# Patient Record
Sex: FEMALE | Race: WHITE | NOT HISPANIC OR LATINO | Employment: OTHER | URBAN - METROPOLITAN AREA
[De-identification: names, ages, dates, MRNs, and addresses within clinical notes are randomized per-mention and may not be internally consistent; named-entity substitution may affect disease eponyms.]

---

## 2017-03-29 PROBLEM — R10.31 RIGHT LOWER QUADRANT ABDOMINAL PAIN: Status: ACTIVE | Noted: 2017-03-29

## 2023-03-24 PROBLEM — N39.46 MIXED STRESS AND URGE URINARY INCONTINENCE: Status: ACTIVE | Noted: 2017-04-07

## 2023-03-24 PROBLEM — E78.5 HYPERLIPIDEMIA: Status: ACTIVE | Noted: 2018-04-27

## 2023-03-24 PROBLEM — Z00.00 MEDICARE ANNUAL WELLNESS VISIT, SUBSEQUENT: Status: ACTIVE | Noted: 2023-03-24

## 2023-03-24 PROBLEM — N36.1 URETHRAL DIVERTICULUM: Status: ACTIVE | Noted: 2017-04-07

## 2023-03-24 PROBLEM — S46.811A STRAIN OF RIGHT TRAPEZIUS MUSCLE: Status: ACTIVE | Noted: 2023-03-24

## 2023-03-24 PROBLEM — N83.209 OVARIAN CYST: Status: ACTIVE | Noted: 2020-01-13

## 2023-03-28 ENCOUNTER — TELEPHONE (OUTPATIENT)
Dept: INTERNAL MEDICINE | Facility: CLINIC | Age: 48
End: 2023-03-28

## 2023-03-28 NOTE — TELEPHONE ENCOUNTER
Caller: Bettye Chanel    Relationship: Self    Best call back number: [phone]    Caller requesting test results: YES    What test was performed: LABS    When was the test performed: 3/24/23    Where was the test performed: IN OFFICE    Additional notes: PLEASE CONTACT PATIENT WITH HER LAB RESULTS ONCE THEY HAVE BEEN REVIEWED

## 2023-03-28 NOTE — TELEPHONE ENCOUNTER
Looks like they haven't resulted, but we will let her know.  Thanks.   This document is complete and the patient is ready for discharge.

## 2023-04-04 ENCOUNTER — TELEPHONE (OUTPATIENT)
Dept: INTERNAL MEDICINE | Facility: CLINIC | Age: 48
End: 2023-04-04

## 2023-04-04 RX ORDER — ERGOCALCIFEROL 1.25 MG/1
50000 CAPSULE ORAL WEEKLY
Qty: 5 CAPSULE | Refills: 1 | Status: SHIPPED | OUTPATIENT
Start: 2023-04-04 | End: 2023-07-19 | Stop reason: SDUPTHER

## 2023-04-04 NOTE — TELEPHONE ENCOUNTER
Caller: Bettye Chanel    Relationship: Self    Best call back number: [phone]    What was the call regarding: PATIENT IS WANTING TO GO OVER HER TEST RESULTS. PATIENT IS ALSO WANTING SCHEDULED FOR HER MAMMOGRAM. PATIENT IS ASKING FOR A CALLBACK.     Do you require a callback: YES

## 2023-04-05 NOTE — TELEPHONE ENCOUNTER
I responded to a MyChart message from patient.  You do not need to call patient.      Dr. Gonzales is out; I am covering for him. Labs from 3/24/23 show normal blood counts. HIV and hepatitis c were negative. One of the tests was not completed due to specimen contamination; however the test was performed in the ER on 3/29/23 and showed a slightly elevated glucose, normal kidney and liver function. Potassium level was normal. Cholesterol and triglycerides were a little elevated. Lipase was normal which decreases risk for pancreatitis. Your Vitamin D was a little low; I will send vitamin d to your pharmacy to take 50,000 units (1 tablet) weekly. Please follow-up with PCP as instructed at your last appointment or sooner if worsening.

## 2023-04-11 PROBLEM — R01.1 HEART MURMUR: Status: ACTIVE | Noted: 2023-04-11

## 2023-04-11 PROBLEM — R16.0 HEPATOMEGALY: Status: ACTIVE | Noted: 2023-04-11

## 2023-04-11 PROBLEM — N81.10 FEMALE CYSTOCELE: Status: RESOLVED | Noted: 2017-03-27 | Resolved: 2023-04-11

## 2023-04-25 PROBLEM — I51.89 DIASTOLIC DYSFUNCTION: Status: ACTIVE | Noted: 2023-04-25

## 2023-04-25 PROBLEM — I35.0 NONRHEUMATIC AORTIC VALVE STENOSIS: Status: ACTIVE | Noted: 2023-03-24

## 2023-04-26 PROBLEM — K76.0 HEPATIC STEATOSIS: Status: ACTIVE | Noted: 2023-04-26

## 2023-05-12 PROBLEM — N60.19 FIBROCYSTIC BREAST: Status: ACTIVE | Noted: 2023-05-12

## 2023-07-25 ENCOUNTER — TELEPHONE (OUTPATIENT)
Dept: INTERNAL MEDICINE | Facility: CLINIC | Age: 48
End: 2023-07-25
Payer: MEDICARE

## 2023-07-25 DIAGNOSIS — K76.0 HEPATIC STEATOSIS: Primary | ICD-10-CM

## 2023-07-25 DIAGNOSIS — E88.89 STEATOSIS: ICD-10-CM

## 2023-07-26 ENCOUNTER — HOSPITAL ENCOUNTER (OUTPATIENT)
Dept: ULTRASOUND IMAGING | Facility: HOSPITAL | Age: 48
Discharge: HOME | End: 2023-07-26
Admitting: STUDENT IN AN ORGANIZED HEALTH CARE EDUCATION/TRAINING PROGRAM
Payer: MEDICARE

## 2023-07-26 ENCOUNTER — LAB (OUTPATIENT)
Dept: INTERNAL MEDICINE | Facility: CLINIC | Age: 48
End: 2023-07-26
Payer: MEDICARE

## 2023-07-26 DIAGNOSIS — R16.0 HEPATOMEGALY: ICD-10-CM

## 2023-07-26 DIAGNOSIS — K76.0 HEPATIC STEATOSIS: ICD-10-CM

## 2023-07-26 DIAGNOSIS — E88.89 STEATOSIS: ICD-10-CM

## 2023-07-26 PROCEDURE — 76705 ECHO EXAM OF ABDOMEN: CPT

## 2023-07-26 PROCEDURE — 36415 COLL VENOUS BLD VENIPUNCTURE: CPT | Performed by: STUDENT IN AN ORGANIZED HEALTH CARE EDUCATION/TRAINING PROGRAM

## 2023-07-28 LAB
A2 MACROGLOB SERPL-MCNC: 206 MG/DL (ref 110–276)
ALT SERPL W P-5'-P-CCNC: 31 IU/L (ref 0–40)
APO A-I SERPL-MCNC: 124 MG/DL (ref 116–209)
AST SERPL W P-5'-P-CCNC: 19 IU/L (ref 0–40)
BILIRUB SERPL-MCNC: 0.4 MG/DL (ref 0–1.2)
CHOLEST SERPL-MCNC: 237 MG/DL (ref 100–199)
FIBROSIS SCORING:: ABNORMAL
FIBROSIS STAGE SERPL QL: ABNORMAL
GGT SERPL-CCNC: 21 IU/L (ref 0–60)
GLUCOSE SERPL-MCNC: 185 MG/DL (ref 70–99)
HAPTOGLOB SERPL-MCNC: 153 MG/DL (ref 42–296)
LABORATORY COMMENT REPORT: ABNORMAL
LIVER FIBR SCORE SERPL CALC.FIBROSURE: 0.15 (ref 0–0.21)
LIVER STEATOSIS GRADE SERPL QL: ABNORMAL
LIVER STEATOSIS SCORE SERPL: 0.82 (ref 0–0.4)
NASH GRADE SERPL QL: ABNORMAL
NASH INTERPRETATION SERPL-IMP: ABNORMAL
NASH SCORE SERPL: 0.32 (ref 0–0.25)
NASH SCORING: ABNORMAL
STEATOSIS SCORING: ABNORMAL
TEST PERFORMANCE INFO SPEC: ABNORMAL
TEST PERFORMANCE INFO SPEC: ABNORMAL
TRIGL SERPL-MCNC: 292 MG/DL (ref 0–149)

## 2023-08-10 DIAGNOSIS — N39.0 URINARY TRACT INFECTION WITHOUT HEMATURIA, SITE UNSPECIFIED: Primary | ICD-10-CM

## 2023-08-10 DIAGNOSIS — N39.0 URINARY TRACT INFECTION WITHOUT HEMATURIA, SITE UNSPECIFIED: ICD-10-CM

## 2023-08-11 ENCOUNTER — TELEPHONE (OUTPATIENT)
Dept: OBSTETRICS AND GYNECOLOGY | Facility: CLINIC | Age: 48
End: 2023-08-11
Payer: MEDICARE

## 2023-08-11 NOTE — TELEPHONE ENCOUNTER
Pt is calling and asking if we had the results of her UTI and US. Wanting to know by end of day if we needed to treat her for the UTI.    Please advise    Thank you!

## 2023-08-12 LAB
APPEARANCE UR: CLEAR
BACTERIA #/AREA URNS HPF: NORMAL /[HPF]
BILIRUB UR QL STRIP: NEGATIVE
CASTS URNS QL MICRO: NORMAL /LPF
COLOR UR: YELLOW
EPI CELLS #/AREA URNS HPF: NORMAL /HPF (ref 0–10)
GLUCOSE UR QL STRIP: ABNORMAL
HGB UR QL STRIP: NEGATIVE
KETONES UR QL STRIP: NEGATIVE
LEUKOCYTE ESTERASE UR QL STRIP: NEGATIVE
MICRO URNS: ABNORMAL
MICRO URNS: ABNORMAL
NITRITE UR QL STRIP: NEGATIVE
PH UR STRIP: 5.5 [PH] (ref 5–7.5)
PROT UR QL STRIP: NEGATIVE
RBC #/AREA URNS HPF: NORMAL /HPF (ref 0–2)
SP GR UR STRIP: >=1.03 (ref 1–1.03)
URINALYSIS REFLEX: ABNORMAL
UROBILINOGEN UR STRIP-MCNC: 0.2 MG/DL (ref 0.2–1)
WBC #/AREA URNS HPF: NORMAL /HPF (ref 0–5)

## 2023-09-07 ENCOUNTER — OFFICE VISIT (OUTPATIENT)
Dept: GASTROENTEROLOGY | Facility: CLINIC | Age: 48
End: 2023-09-07
Payer: MEDICARE

## 2023-09-07 VITALS
HEART RATE: 84 BPM | WEIGHT: 209 LBS | DIASTOLIC BLOOD PRESSURE: 74 MMHG | HEIGHT: 64 IN | SYSTOLIC BLOOD PRESSURE: 133 MMHG | BODY MASS INDEX: 35.68 KG/M2

## 2023-09-07 DIAGNOSIS — E11.65 TYPE 2 DIABETES MELLITUS WITH HYPERGLYCEMIA, WITH LONG-TERM CURRENT USE OF INSULIN: ICD-10-CM

## 2023-09-07 DIAGNOSIS — E66.01 CLASS 2 SEVERE OBESITY WITH SERIOUS COMORBIDITY AND BODY MASS INDEX (BMI) OF 35.0 TO 35.9 IN ADULT, UNSPECIFIED OBESITY TYPE: ICD-10-CM

## 2023-09-07 DIAGNOSIS — R16.0 HEPATOMEGALY: ICD-10-CM

## 2023-09-07 DIAGNOSIS — Z79.4 TYPE 2 DIABETES MELLITUS WITH HYPERGLYCEMIA, WITH LONG-TERM CURRENT USE OF INSULIN: ICD-10-CM

## 2023-09-07 DIAGNOSIS — K58.1 IRRITABLE BOWEL SYNDROME WITH CONSTIPATION: Primary | ICD-10-CM

## 2023-09-07 DIAGNOSIS — Z98.890 HISTORY OF COLONOSCOPY: ICD-10-CM

## 2023-09-07 DIAGNOSIS — K76.0 FATTY INFILTRATION OF LIVER: ICD-10-CM

## 2023-09-07 DIAGNOSIS — R10.9 ABDOMINAL PAIN, UNSPECIFIED ABDOMINAL LOCATION: ICD-10-CM

## 2023-09-07 RX ORDER — EMPAGLIFLOZIN 10 MG/1
TABLET, FILM COATED ORAL
Qty: 30 TABLET | Refills: 0 | Status: SHIPPED | OUTPATIENT
Start: 2023-09-07

## 2023-09-07 RX ORDER — TENAPANOR HYDROCHLORIDE 53.2 MG/1
50 TABLET ORAL DAILY
Qty: 90 TABLET | Refills: 3 | Status: SHIPPED | OUTPATIENT
Start: 2023-09-07

## 2023-09-07 RX ORDER — FLUTICASONE PROPIONATE 50 MCG
SPRAY, SUSPENSION (ML) NASAL
COMMUNITY

## 2023-09-07 RX ORDER — TENAPANOR HYDROCHLORIDE 53.2 MG/1
50 TABLET ORAL DAILY
Qty: 18 TABLET | Refills: 0 | COMMUNITY
Start: 2023-09-07

## 2023-09-07 NOTE — PROGRESS NOTES
GASTROENTEROLOGY OFFICE NOTE    Bettye Diaz  [id number]  1975    CARE TEAM  Patient Care Team:  Daryn Gonzales MD as PCP - General (Family Medicine)    Referring Provider: Daryn Gonzales MD    Chief Complaint   Patient presents with    CAMPBELL 1, Right Side Swelling, Abdominal pain, nausea, consti        HISTORY OF PRESENT ILLNESS:   Bettye Diaz is a 47 y.o. female who presents to the clinic today as a referral from Dr. Daryn Gonzales for evaluation regarding hepatomegaly, hepatic steatosis (review of PMH as below reveals history of cirrhosis), prior referral for gastroparesis.  Gastric emptying study previously ordered but I do not believe it was completed.    3/29/2023 CT abdomen and pelvis without contrast revealed nonspecific hepatomegaly with liver measuring 22 cm and craniocaudal dimension.    4/25/2023 ultrasound of the abdomen due to right upper quadrant abdominal pain, epigastric pain revealed moderate diffuse hepatic steatosis, prior cholecystectomy with unremarkable 8 mm common bile duct likely prominent secondary to postcholecystectomy state.    7/26/2023 ultrasound of the liver due to hepatomegaly revealed hepatic steatosis, liver measures 20 cm in craniocaudal length.      7/26/2023 [facility] revealed F0, S2-S3, and 1 mild CAMPBELL.  Bilirubin normal 0.4.  GGT normal 21, ALT normal 31, AST normal at 19.   Prior CBC with normal platelet count, INR normal, CMP with normal albumin.     She reports right side abdominal pain, intermittent swelling of the right side as well as intermittent bulge in the middle of the abdomen.  She has a bowel movement every other day.  It does not seem as though on days she has a bowel movement swelling is improved.  She has history of  treatment for constipation. MiraLAX previously helpful but not covered by insurance.  Prior use of Linzess caused diarrhea.  Prior use of Trulance was not helpful    She had prior colonoscopy at [facility] or [facility]  [facility] possibly approximately 5 years ago with possible finding of colitis.  It seems as though it was recommended she repeat colonoscopy in 5 years.    Hydrocodone on medication list but she reports she does not take hydrocodone very often.  She occasionally takes naproxen.  She has been taking omeprazole for approximately 18 years due to history of heartburn and reflux.  If she takes omeprazole daily she does not experience heartburn or reflux.  If she does not take omeprazole she has heartburn or reflux.    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    Urinary leakage         Past Surgical History:   Procedure Laterality Date    ABDOMINAL SURGERY      ANKLE SURGERY      BACK SURGERY  1989    BACK SURGERY  03/2019    BONE SPUR LEG      CERVICAL DISCECTOMY ANTERIOR      CERVICAL FUSION      CHOLECYSTECTOMY  1998    COLONOSCOPY      HYSTERECTOMY  2016    LUMBAR DISCECTOMY      NECK SURGERY  2015    OOPHORECTOMY Right     OVARY SURGERY Bilateral     ovarian drilling    TONSILLECTOMY          Current Outpatient Medications on File Prior to Visit   Medication Sig    BD Insulin Syringe U/F 31G X 5/16\" 1 ML misc USE 1 SYRINGE 4 TIMES DAILY AS DIRECTED    Blood Glucose Monitoring Suppl (Accu-Chek Guide Me) w/Device kit USE TO CHECK GLUCOSE AS DIRECTED THREE TIMES DAILY    cetirizine (ZyrTEC) 10 MG chewable tablet Zyrtec    cyclobenzaprine (FLEXERIL) 10 MG tablet cyclobenzaprine 10 mg tablet    fluconazole (DIFLUCAN) 150 MG tablet Take 1 tab PO daily at beginning of abx, then 1 tab PO daily at end of abx    fluticasone (FLONASE) 50 MCG/ACT nasal spray Spray 2 " sprays every day by intranasal route for 30 days.    glucose blood test strip Use 3/day  DX CODE E 11.9    HYDROcodone-acetaminophen (NORCO) 5-325 MG per tablet Take 1 tablet by mouth Every 6 (Six) Hours As Needed for Severe Pain for up to 12 doses.    Insulin Aspart (novoLOG) 100 UNIT/ML injection Inject 90 Units under the skin into the appropriate area as directed 3 (Three) Times a Day Before Meals.    insulin glargine (LANTUS, SEMGLEE) 100 UNIT/ML injection Inject 90 Units under the skin into the appropriate area as directed Every Night.    Lancets 28G misc lancets   Two times a day    naproxen (NAPROSYN) 500 MG tablet Take 1 tablet by mouth 2 (Two) Times a Day With Meals.    omeprazole (priLOSEC) 40 MG capsule omeprazole 40 mg capsule,delayed release   Take 1 capsule by mouth twice daily    pravastatin (PRAVACHOL) 10 MG tablet     telmisartan (MICARDIS) 80 MG tablet Take 1 tablet by mouth Daily.    vitamin D (ERGOCALCIFEROL) 1.25 MG (43424 UT) capsule capsule Take 1 capsule by mouth 1 (One) Time Per Week.    DULoxetine (CYMBALTA) 60 MG capsule TAKE 1 CAPSULE BY MOUTH ONCE DAILY FOR ANXIETY AND FIBROMYALGIA    [DISCONTINUED] benzonatate (TESSALON) 100 MG capsule Take 1 capsule by mouth 3 (Three) Times a Day As Needed for Cough.    [DISCONTINUED] doxycycline (MONODOX) 100 MG capsule Take 1 capsule by mouth 2 (Two) Times a Day.    [DISCONTINUED] empagliflozin (Jardiance) 10 MG tablet tablet Take 1 tablet by mouth Daily.    [DISCONTINUED] metoclopramide (REGLAN) 10 MG tablet Take 1 tablet by mouth 3 (Three) Times a Day With Meals.    [DISCONTINUED] metoprolol succinate XL (TOPROL-XL) 50 MG 24 hr tablet Take 1 tablet by mouth every night at bedtime.    [DISCONTINUED] ondansetron ODT (ZOFRAN-ODT) 4 MG disintegrating tablet ondansetron 4 mg disintegrating tablet   DISSOLVE 1 TABLET IN MOUTH EVERY 6 HOURS AS NEEDED FOR NAUSEA FOR UP TO 5 DAYS     No current facility-administered medications on file prior to visit.  "      Allergies   Allergen Reactions    Contrast Dye (Echo Or Unknown Ct/Mr) Anaphylaxis    Iodine Swelling     Ct dye/DROPS BLOOD PRESSURE    Shellfish-Derived Products Swelling     SWELLING/NAUSEA & VOMITING    Meloxicam Rash     Other reaction(s): My doctor told me to stop taking aspirin because of GI upset    Metronidazole GI Intolerance, Diarrhea, Nausea And Vomiting, Other (See Comments) and Rash     2when taken orally and topically localized rash and blisters  BURNING  BURNING  BURNING      Miconazole Nitrate Rash    Oxycodone Rash    Sulfamethoxazole-Trimethoprim Rash       Family History   Problem Relation Age of Onset    Hypertension Mother     Diabetes Mother     Pancreatitis Father     Prostate cancer Father     Heart disease Father     Breast cancer Paternal Aunt         Late 40's    Breast cancer Paternal Aunt         Early 50's    Breast cancer Paternal Aunt         Late 40's    Ovarian cancer Maternal Grandmother 50    Osteoporosis Maternal Grandmother     Diabetes Maternal Grandmother     Heart disease Maternal Grandmother     Ovarian cancer Maternal Grandfather 53    Diabetes Maternal Grandfather     Ovarian cancer Other 45        cousins    Endometrial cancer Neg Hx        Social History     Socioeconomic History    Marital status:    Tobacco Use    Smoking status: Never    Smokeless tobacco: Never   Vaping Use    Vaping Use: Never used   Substance and Sexual Activity    Alcohol use: No    Drug use: No    Sexual activity: Yes     Partners: Male     Birth control/protection: Surgical, Hysterectomy       PHYSICAL EXAM   /74 (BP Location: Left arm, Patient Position: Sitting, Cuff Size: Adult)   Pulse 84   Ht 162.6 cm (64\")   Wt 94.8 kg (209 lb)   LMP  (LMP Unknown)   BMI 35.87 kg/m²   Physical Exam  Constitutional:       General: She is not in acute distress.     Appearance: She is not toxic-appearing.   HENT:      Head: Normocephalic and atraumatic. No contusion.      Right Ear: " External ear normal.      Left Ear: External ear normal.   Eyes:      General: Lids are normal. No scleral icterus.        Right eye: No discharge.         Left eye: No discharge.      Extraocular Movements: Extraocular movements intact.   Neck:      Trachea: Trachea normal.      Comments: No visible mass  No visible adenopathy  Cardiovascular:      Rate and Rhythm: Normal rate.   Pulmonary:      Effort: No respiratory distress.      Comments: Symmetrical expansion    Abdominal:      Palpations: Abdomen is soft. There is no mass.      Tenderness: There is abdominal tenderness in the epigastric area.      Comments: Suspect diastasis recti   Musculoskeletal:      Right lower leg: No edema.      Left lower leg: No edema.      Comments: Symmetrical movement of upper extremities  Symmetrical movement of lower extremities  No visible deformities   Skin:     General: Skin is warm and dry.      Coloration: Skin is not jaundiced.   Neurological:      General: No focal deficit present.      Mental Status: She is alert and oriented to person, place, and time.   Psychiatric:         Mood and Affect: Mood normal.         Behavior: Behavior normal.         Thought Content: Thought content normal.       Results Review:  3/29/2023 CT abdomen and pelvis without contrast revealed nonspecific hepatomegaly with liver measuring 22 cm and craniocaudal dimension.    4/25/2023 ultrasound of the abdomen due to right upper quadrant abdominal pain, epigastric pain revealed moderate diffuse hepatic steatosis, prior cholecystectomy with unremarkable 8 mm common bile duct likely prominent secondary to postcholecystectomy state.    7/26/2023 ultrasound of the liver due to hepatomegaly revealed hepatic steatosis, liver measures 20 cm in craniocaudal length.      7/26/2023 [facility] revealed F0, S2-S3, and 1 mild CAMPBELL.  Bilirubin normal 0.4.  GGT normal 21, ALT normal 31, AST normal at 19.   CMP          3/24/2023    14:37 3/29/2023    17:38  7/12/2023    15:40   CMP   Glucose CANCELED  149  CANCELED    BUN 12  10  14    Creatinine 0.63  0.76  0.66    EGFR  98.0     Sodium 138  138  138    Potassium CANCELED  3.8  CANCELED    Chloride 104  103  101    Calcium 9.2  8.8  8.9    Total Protein 6.5   6.5    Total Protein  6.7     Albumin 4.1  3.6  4.1    Globulin 2.4   2.4    Globulin  3.1     Total Bilirubin 0.3  0.2  0.5    Alkaline Phosphatase 57  63  54    AST (SGOT) 12  13  21    ALT (SGPT) 14  14  27    Albumin/Globulin Ratio  1.2     BUN/Creatinine Ratio 19  13.2  21    Anion Gap  12.0           ASSESSMENT / PLAN  1. Irritable bowel syndrome with constipation  - MiraLAX previously helpful but not covered by insurance.  Prior use of Linzess caused diarrhea.  Prior use of Trulance was not helpful  - trial of taking Ibsrela daily, [facility] with medication asccess  - suspect IBS contributing to abdominal pain  - Tenapanor HCl (Ibsrela) 50 MG tablet; Take 1 tablet by mouth Daily.  Dispense: 18 tablet; Refill: 0 SAMPLE  - Tenapanor HCl (Ibsrela) 50 MG tablet; Take 1 tablet by mouth Daily. Prior to a meal  Dispense: 90 tablet; Refill: 3    2. Abdominal pain, unspecified abdominal location  - plan for EGD for additional evaluation  -Continue omeprazole 40 mg daily  -Treat suspected irritable bowel syndrome as above as I suspect irritable bowel syndrome contributing to abdominal pain    3. History of colonoscopy  - BALJIT signed by the patient in an attempt to obtain prior colonoscopy report, pathology report if available, follow-up letter to the patient to determine when repeat colonoscopy is due.  If she was told colonoscopy needed to be repeated in 5 years and it was performed in 2018 she is due for repeat colonoscopy this year.  -I was unable to find report In [facility] care link under Bettye Diaz and Bettye Zazueta.   4. Hepatomegaly  5. Hepatic steatosis   6. Obesity  - currently with normal liver enzymes, recommend  checking liver enzymes, albumin to evaluate for hypoalbuminemia, platelet count to evaluate for thrombocytopenia (due to concern for cirrhosis with hypoalbuminemia and thrombocytopenia, INR (if prolonged raises concern for cirrhosis) at our office or with PCP every 6 months to 1 year  - continue to avoid alcohol  - 10% of total body weight loss would be helpful  - consider checking for immunity to hepatitis A and B in the future.   - 7/26/2023 [facility] revealed F0, S2-S3, and 1 mild CAMPBELL.  Bilirubin normal 0.4.  GGT normal 21, ALT normal 31, AST normal at 19.     Suspect she has diastasis recti for which I recommend weight loss and core strengthening exercises.     Return for Follow-up after EGD.    Lupis Echols, KACY  09/07/2023

## 2023-09-13 ENCOUNTER — OFFICE VISIT (OUTPATIENT)
Dept: INTERNAL MEDICINE | Facility: CLINIC | Age: 48
End: 2023-09-13
Payer: MEDICARE

## 2023-09-13 VITALS
RESPIRATION RATE: 18 BRPM | DIASTOLIC BLOOD PRESSURE: 78 MMHG | HEART RATE: 78 BPM | SYSTOLIC BLOOD PRESSURE: 126 MMHG | BODY MASS INDEX: 36.01 KG/M2 | WEIGHT: 209.8 LBS | TEMPERATURE: 97.7 F

## 2023-09-13 DIAGNOSIS — K58.1 IRRITABLE BOWEL SYNDROME WITH CONSTIPATION: ICD-10-CM

## 2023-09-13 DIAGNOSIS — K76.0 HEPATIC STEATOSIS: ICD-10-CM

## 2023-09-13 DIAGNOSIS — B37.31 VULVOVAGINAL CANDIDIASIS: ICD-10-CM

## 2023-09-13 DIAGNOSIS — Z79.4 TYPE 2 DIABETES MELLITUS WITH HYPERGLYCEMIA, WITH LONG-TERM CURRENT USE OF INSULIN: Primary | ICD-10-CM

## 2023-09-13 DIAGNOSIS — Z80.41 FAMILY HISTORY OF OVARIAN CANCER: ICD-10-CM

## 2023-09-13 DIAGNOSIS — E11.65 TYPE 2 DIABETES MELLITUS WITH HYPERGLYCEMIA, WITH LONG-TERM CURRENT USE OF INSULIN: Primary | ICD-10-CM

## 2023-09-13 DIAGNOSIS — E55.9 VITAMIN D DEFICIENCY: ICD-10-CM

## 2023-09-13 PROBLEM — R10.11 RUQ PAIN: Status: RESOLVED | Noted: 2023-03-24 | Resolved: 2023-09-13

## 2023-09-13 PROBLEM — R10.31 RIGHT LOWER QUADRANT ABDOMINAL PAIN: Status: RESOLVED | Noted: 2017-03-29 | Resolved: 2023-09-13

## 2023-09-13 LAB
EXPIRATION DATE: NORMAL
HBA1C MFR BLD: 7.9 %
Lab: NORMAL

## 2023-09-13 RX ORDER — ERGOCALCIFEROL 1.25 MG/1
50000 CAPSULE ORAL WEEKLY
Qty: 6 CAPSULE | Refills: 0 | Status: SHIPPED | OUTPATIENT
Start: 2023-09-13

## 2023-09-13 RX ORDER — FLUCONAZOLE 150 MG/1
150 TABLET ORAL ONCE
Qty: 2 TABLET | Refills: 0 | Status: SHIPPED | OUTPATIENT
Start: 2023-09-13 | End: 2023-09-13

## 2023-09-13 RX ORDER — INSULIN ASPART 100 [IU]/ML
20 INJECTION, SOLUTION INTRAVENOUS; SUBCUTANEOUS
Qty: 100 ML | Refills: 3 | Status: SHIPPED | OUTPATIENT
Start: 2023-09-13

## 2023-09-13 NOTE — ASSESSMENT & PLAN NOTE
- Glucagon 1 mg/0.2mL solution auto-injector, Jardiance 25 mg, and Novolog 100 unit/mL injection have been sent to patient's pharmacy.  - POC hemoglobin A1c has been collected today.

## 2023-09-13 NOTE — PROGRESS NOTES
"    Follow Up Office Visit      Date: 2023   Patient Name: Bettye Diaz  : 1975   MRN: [id number]     Chief Complaint:    Chief Complaint   Patient presents with    Diabetes     fu       History of Present Illness: Bettye Diaz is a 47 y.o. female who is here today to follow up with diabetes mellitus.    Hepatic steatosis; Irritable bowel syndrome with constipation  Ms. Diaz states she is feeling better. She has established care with gastroenterology, but she has not noticed much improvement at this time. She also notes that she has only been taking her medication for a short period of time. Her gastroenterologist suspects that she may have an ulcer, so she is scheduled to have an esophagogastroduodenoscopy performed on 10/27/2023. She was previously set up to have H. pylori testing collected, but she was feeling so sick and unable to stay off of omeprazole. She experiences abdominal pain that primarily occurs during the middle of the night. She describes feeling like something is \"kicking from the inside out.\" This will also occur when she has not eaten in a while.    Type 2 diabetes mellitus with hyperglycemia, with long-term current use of insulin  The patient does feel that Jardiance has been working quite well for her and is requesting a refill of the medication. She reports that her blood glucose levels have improved and have been running between 130 and 150 mg/dL. She states her fasting blood glucose level this morning was 180 mg/dL, but she believes that this is because she had cereal for dinner on 2023. The patient has been having to take less insulin because her glucose levels are trending so well. She has been using a sliding scale for her insulin, and her mealtime doses are typically between 20 and 24 units. She has been monitoring her carbohydrate intake. She believes that she had a hypoglycemic moment on 2023, but she did not check her glucose level because she " "was feeling \"lazy.\" She then got up and began eating a power bar which contains minimal carbohydrates. She has lost approximately 20 pounds. The patient will be scheduling an appointment for her eye examination. She is very excited to have her hemoglobin A1c checked. Each time she checks her blood pressure at home, it appears to be trending quite well. She feels that telmisartan has been working well for her and mentions that her swelling has improved.    Family history of ovarian cancer  The patient wishes to hold off on proceeding with genetic screenings at this time. She has previously undergone a unilateral oophorectomy.    Vitamin D deficiency  The patient has had a history of vitamin D deficiency for a couple of years.    Vulvovaginal candidiasis  Ms. Diaz is experiencing some minor symptoms consistent with a yeast infection and is requesting a refill of Diflucan. She is unsure if this may be caused by Jardiance.      Subjective      Review of Systems:   Review of Systems   Constitutional:  Negative for activity change, appetite change, fatigue and fever.   Eyes:  Negative for blurred vision, photophobia and visual disturbance.   Respiratory:  Negative for cough, chest tightness and shortness of breath.    Cardiovascular:  Negative for chest pain, palpitations and leg swelling.   Gastrointestinal:  Positive for abdominal pain. Negative for abdominal distention, blood in stool, constipation, diarrhea, nausea and vomiting.   Genitourinary:  Negative for dysuria and hematuria.   Musculoskeletal:  Negative for arthralgias, back pain and joint swelling.   Skin:  Negative for rash and wound.   Neurological:  Negative for weakness, headache and confusion.     I have reviewed the patients family history, social history, past medical history, past surgical history and have updated it as appropriate.     Medications:     Current Outpatient Medications:     BD Insulin Syringe U/F 31G X 5/16\" 1 ML misc, USE 1 SYRINGE 4 " TIMES DAILY AS DIRECTED, Disp: , Rfl:     Blood Glucose Monitoring Suppl (Accu-Chek Guide Me) w/Device kit, USE TO CHECK GLUCOSE AS DIRECTED THREE TIMES DAILY, Disp: , Rfl:     cetirizine (ZyrTEC) 10 MG chewable tablet, Zyrtec, Disp: , Rfl:     cyclobenzaprine (FLEXERIL) 10 MG tablet, cyclobenzaprine 10 mg tablet, Disp: , Rfl:     DULoxetine (CYMBALTA) 60 MG capsule, TAKE 1 CAPSULE BY MOUTH ONCE DAILY FOR ANXIETY AND FIBROMYALGIA, Disp: , Rfl:     empagliflozin (Jardiance) 25 MG tablet tablet, Take 1 tablet by mouth Daily., Disp: 60 tablet, Rfl: 1    fluticasone (FLONASE) 50 MCG/ACT nasal spray, Spray 2 sprays every day by intranasal route for 30 days., Disp: , Rfl:     glucose blood test strip, Use 3/day  DX CODE E 11.9, Disp: , Rfl:     HYDROcodone-acetaminophen (NORCO) 5-325 MG per tablet, Take 1 tablet by mouth Every 6 (Six) Hours As Needed for Severe Pain for up to 12 doses., Disp: 12 tablet, Rfl: 0    Insulin Aspart (novoLOG) 100 UNIT/ML injection, Inject 20 Units under the skin into the appropriate area as directed 3 (Three) Times a Day Before Meals., Disp: 100 mL, Rfl: 3    insulin glargine (LANTUS, SEMGLEE) 100 UNIT/ML injection, Inject 90 Units under the skin into the appropriate area as directed Every Night., Disp: , Rfl:     Lancets 28G misc, lancets  Two times a day, Disp: , Rfl:     naproxen (NAPROSYN) 500 MG tablet, Take 1 tablet by mouth 2 (Two) Times a Day With Meals., Disp: , Rfl:     omeprazole (priLOSEC) 40 MG capsule, omeprazole 40 mg capsule,delayed release  Take 1 capsule by mouth twice daily, Disp: , Rfl:     pravastatin (PRAVACHOL) 10 MG tablet, , Disp: , Rfl:     telmisartan (MICARDIS) 80 MG tablet, Take 1 tablet by mouth Daily., Disp: 90 tablet, Rfl: 3    Tenapanor HCl (Ibsrela) 50 MG tablet, Take 1 tablet by mouth Daily., Disp: 18 tablet, Rfl: 0    Tenapanor HCl (Ibsrela) 50 MG tablet, Take 1 tablet by mouth Daily. Prior to a meal, Disp: 90 tablet, Rfl: 3    Glucagon 1 MG/0.2ML solution  auto-injector, Inject 1 mg under the skin into the appropriate area as directed Every 15 (Fifteen) Minutes As Needed (Hypoglycemia)., Disp: 0.4 mL, Rfl: 3    vitamin D (ERGOCALCIFEROL) 1.25 MG (69137 UT) capsule capsule, Take 1 capsule by mouth 1 (One) Time Per Week., Disp: 6 capsule, Rfl: 0    Allergies:   Allergies   Allergen Reactions    Contrast Dye (Echo Or Unknown Ct/Mr) Anaphylaxis    Iodine Swelling     Ct dye/DROPS BLOOD PRESSURE    Shellfish-Derived Products Swelling     SWELLING/NAUSEA & VOMITING    Meloxicam Rash     Other reaction(s): My doctor told me to stop taking aspirin because of GI upset    Metronidazole GI Intolerance, Diarrhea, Nausea And Vomiting, Other (See Comments) and Rash     2when taken orally and topically localized rash and blisters  BURNING  BURNING  BURNING      Miconazole Nitrate Rash    Oxycodone Rash    Sulfamethoxazole-Trimethoprim Rash       Objective     Physical Exam: Please see above  Vital Signs:   Vitals:    09/13/23 1106   BP: 126/78   BP Location: Right arm   Patient Position: Sitting   Cuff Size: Adult   Pulse: 78   Resp: 18   Temp: 97.7 °F (36.5 °C)   TempSrc: Temporal   Weight: 95.2 kg (209 lb 12.8 oz)   PainSc: 0-No pain     Body mass index is 36.01 kg/m².  Class 2 Severe Obesity (BMI >=35 and <=39.9). Obesity-related health conditions include the following: diabetes mellitus. Obesity is improving with lifestyle modifications. BMI is is above average; BMI management plan is completed. We discussed portion control and increasing exercise.       Physical Exam  Vitals and nursing note reviewed.   Constitutional:       General: She is not in acute distress.     Appearance: Normal appearance. She is normal weight. She is not ill-appearing or toxic-appearing.   HENT:      Nose: No congestion or rhinorrhea.   Eyes:      General:         Right eye: No discharge.         Left eye: No discharge.      Conjunctiva/sclera: Conjunctivae normal.   Pulmonary:      Effort: Pulmonary  effort is normal. No respiratory distress.   Abdominal:      General: Abdomen is flat. There is no distension.   Musculoskeletal:      Cervical back: Normal range of motion.   Skin:     Coloration: Skin is not jaundiced.      Findings: No rash.   Neurological:      General: No focal deficit present.      Mental Status: She is alert. Mental status is at baseline.      Coordination: Coordination normal.      Gait: Gait normal.   Psychiatric:         Mood and Affect: Mood normal.         Behavior: Behavior normal.         Thought Content: Thought content normal.         Judgment: Judgment normal.       Procedures    Results:   Labs:   Hemoglobin A1C   Date Value Ref Range Status   09/13/2023 7.9 % Final   04/18/2023 7.9 4.4 - 6.6 % Final     TSH   Date Value Ref Range Status   07/12/2023 1.020 0.450 - 4.500 uIU/mL Final        Imaging:   No valid procedures specified.     Assessment / Plan      Assessment/Plan:   Problem List Items Addressed This Visit       Type 2 diabetes mellitus with hyperglycemia, with long-term current use of insulin - Primary    Current Assessment & Plan     - Glucagon 1 mg/0.2mL solution auto-injector, Jardiance 25 mg, and Novolog 100 unit/mL injection have been sent to patient's pharmacy.  - POC hemoglobin A1c has been collected today.         Relevant Medications    Glucagon 1 MG/0.2ML solution auto-injector    empagliflozin (Jardiance) 25 MG tablet tablet    Insulin Aspart (novoLOG) 100 UNIT/ML injection    Other Relevant Orders    POC Glycosylated Hemoglobin (Hb A1C) (Completed)    Family history of ovarian cancer    Current Assessment & Plan     - Patient wishes to hold off on genetic testing at this time.         Vitamin D deficiency    Current Assessment & Plan     - Vitamin D 1.25 mg has been sent to patient's pharmacy.         Relevant Medications    vitamin D (ERGOCALCIFEROL) 1.25 MG (55449 UT) capsule capsule    Irritable bowel syndrome with constipation    Current Assessment & Plan      - Patient scheduled for esophagogastroduodenoscopy on 10/27/2023.         Hepatic steatosis    Overview     Per Fibrosure on 7/28/23: moderate to severe         Current Assessment & Plan     - Patient scheduled for esophagogastroduodenoscopy on 10/27/2023.         Vulvovaginal candidiasis    Current Assessment & Plan     - Diflucan 150 mg has been prescribed.              Follow Up:   Return in about 3 months (around 12/13/2023) for Recheck.          Daryn Gonzales MD  [facility]    Transcribed from ambient dictation for Daryn Gonzales MD by Tayler Huddleston.  09/13/23   14:01 EDT    Patient or patient representative verbalized consent to the visit recording.  I have personally performed the services described in this document as transcribed by the above individual, and it is both accurate and complete.

## 2023-09-19 ENCOUNTER — TELEPHONE (OUTPATIENT)
Dept: INTERNAL MEDICINE | Facility: CLINIC | Age: 48
End: 2023-09-19
Payer: MEDICARE

## 2023-09-19 DIAGNOSIS — K21.00 GASTROESOPHAGEAL REFLUX DISEASE WITH ESOPHAGITIS WITHOUT HEMORRHAGE: Primary | ICD-10-CM

## 2023-09-19 RX ORDER — SUCRALFATE ORAL 1 G/10ML
1 SUSPENSION ORAL 3 TIMES DAILY
Qty: 414 ML | Refills: 3 | Status: SHIPPED | OUTPATIENT
Start: 2023-09-19

## 2023-09-19 NOTE — TELEPHONE ENCOUNTER
Sucralfate added onto omeprazole 40 mg twice daily due to uncontrolled esophagitis and pending EGD.

## 2023-09-20 DIAGNOSIS — B37.31 VULVOVAGINAL CANDIDIASIS: Primary | ICD-10-CM

## 2023-09-20 RX ORDER — FLUCONAZOLE 150 MG/1
150 TABLET ORAL ONCE
Qty: 2 TABLET | Refills: 0 | Status: SHIPPED | OUTPATIENT
Start: 2023-09-20 | End: 2023-09-20

## 2023-09-28 ENCOUNTER — TELEPHONE (OUTPATIENT)
Dept: GASTROENTEROLOGY | Facility: CLINIC | Age: 48
End: 2023-09-28
Payer: MEDICARE

## 2023-09-28 NOTE — TELEPHONE ENCOUNTER
Auth Approved for Ibsrela 50mg good until 12/31/2023.       Prior auth for Ibsrela 50mg, initiated with [employer] ins over the phone. Awaiting decision.

## 2023-10-20 ENCOUNTER — HOSPITAL ENCOUNTER (OUTPATIENT)
Dept: GENERAL RADIOLOGY | Facility: HOSPITAL | Age: 48
Discharge: HOME | End: 2023-10-20
Admitting: STUDENT IN AN ORGANIZED HEALTH CARE EDUCATION/TRAINING PROGRAM
Payer: MEDICARE

## 2023-10-20 ENCOUNTER — TRANSCRIBE ORDERS (OUTPATIENT)
Dept: ADMINISTRATIVE | Facility: HOSPITAL | Age: 48
End: 2023-10-20
Payer: MEDICARE

## 2023-10-20 DIAGNOSIS — M54.50 CHRONIC MIDLINE LOW BACK PAIN, UNSPECIFIED WHETHER SCIATICA PRESENT: Primary | ICD-10-CM

## 2023-10-20 DIAGNOSIS — G89.29 CHRONIC MIDLINE LOW BACK PAIN, UNSPECIFIED WHETHER SCIATICA PRESENT: Primary | ICD-10-CM

## 2023-10-20 PROCEDURE — 72114 X-RAY EXAM L-S SPINE BENDING: CPT

## 2024-05-06 ENCOUNTER — TRANSCRIBE ORDERS (OUTPATIENT)
Dept: NUTRITION | Facility: HOSPITAL | Age: 49
End: 2024-05-06
Payer: MEDICARE

## 2024-05-06 DIAGNOSIS — K31.84 GASTROPARESIS: Primary | ICD-10-CM

## 2024-05-23 ENCOUNTER — TELEPHONE (OUTPATIENT)
Dept: GASTROENTEROLOGY | Facility: CLINIC | Age: 49
End: 2024-05-23
Payer: MEDICARE

## 2024-05-23 NOTE — TELEPHONE ENCOUNTER
REACHED OUT TO PTS INSURANCE TO GATHER PA FOR PT PROCEDURE.  APPROVED FOR CPT 58781.   AUTH & REF # [id number]

## 2024-06-05 ENCOUNTER — OUTSIDE FACILITY SERVICE (OUTPATIENT)
Dept: GASTROENTEROLOGY | Facility: CLINIC | Age: 49
End: 2024-06-05
Payer: MEDICARE

## 2024-06-05 PROCEDURE — 43239 EGD BIOPSY SINGLE/MULTIPLE: CPT | Performed by: INTERNAL MEDICINE

## 2024-06-05 PROCEDURE — 88305 TISSUE EXAM BY PATHOLOGIST: CPT | Performed by: INTERNAL MEDICINE

## 2024-06-06 ENCOUNTER — LAB REQUISITION (OUTPATIENT)
Dept: LAB | Facility: HOSPITAL | Age: 49
End: 2024-06-06
Payer: MEDICARE

## 2024-06-06 DIAGNOSIS — J02.9 ACUTE PHARYNGITIS, UNSPECIFIED: ICD-10-CM

## 2024-06-06 DIAGNOSIS — K29.70 GASTRITIS, UNSPECIFIED, WITHOUT BLEEDING: ICD-10-CM

## 2024-06-06 DIAGNOSIS — K44.9 DIAPHRAGMATIC HERNIA WITHOUT OBSTRUCTION OR GANGRENE: ICD-10-CM

## 2024-06-06 DIAGNOSIS — R10.13 EPIGASTRIC PAIN: ICD-10-CM

## 2024-06-06 DIAGNOSIS — R07.0 PAIN IN THROAT: ICD-10-CM

## 2024-06-06 DIAGNOSIS — K21.9 GASTRO-ESOPHAGEAL REFLUX DISEASE WITHOUT ESOPHAGITIS: ICD-10-CM

## 2024-06-06 DIAGNOSIS — K31.7 POLYP OF STOMACH AND DUODENUM: ICD-10-CM

## 2024-06-06 DIAGNOSIS — K31.84 GASTROPARESIS: ICD-10-CM

## 2024-06-07 LAB
CYTO UR: NORMAL
LAB AP CASE REPORT: NORMAL
LAB AP CLINICAL INFORMATION: NORMAL
PATH REPORT.FINAL DX SPEC: NORMAL
PATH REPORT.GROSS SPEC: NORMAL

## 2024-06-26 LAB
CYTO UR: NORMAL
LAB AP CASE REPORT: NORMAL
LAB AP CLINICAL INFORMATION: NORMAL
LAB AP DIAGNOSIS COMMENT: NORMAL
PATH REPORT.FINAL DX SPEC: NORMAL
PATH REPORT.GROSS SPEC: NORMAL

## 2024-08-20 ENCOUNTER — TRANSCRIBE ORDERS (OUTPATIENT)
Dept: ADMINISTRATIVE | Facility: HOSPITAL | Age: 49
End: 2024-08-20
Payer: MEDICARE

## 2024-08-20 DIAGNOSIS — Z12.31 VISIT FOR SCREENING MAMMOGRAM: Primary | ICD-10-CM

## 2024-08-26 ENCOUNTER — OFFICE VISIT (OUTPATIENT)
Dept: GASTROENTEROLOGY | Facility: CLINIC | Age: 49
End: 2024-08-26
Payer: MEDICARE

## 2024-08-26 VITALS — WEIGHT: 207 LBS | HEIGHT: 64 IN | BODY MASS INDEX: 35.34 KG/M2

## 2024-08-26 DIAGNOSIS — K59.04 CHRONIC IDIOPATHIC CONSTIPATION: ICD-10-CM

## 2024-08-26 DIAGNOSIS — K76.0 HEPATIC STEATOSIS: Primary | ICD-10-CM

## 2024-08-26 DIAGNOSIS — K31.84 GASTROPARESIS: ICD-10-CM

## 2024-08-26 DIAGNOSIS — K21.9 GASTROESOPHAGEAL REFLUX DISEASE WITHOUT ESOPHAGITIS: ICD-10-CM

## 2024-08-26 PROCEDURE — 1160F RVW MEDS BY RX/DR IN RCRD: CPT | Performed by: INTERNAL MEDICINE

## 2024-08-26 PROCEDURE — 1159F MED LIST DOCD IN RCRD: CPT | Performed by: INTERNAL MEDICINE

## 2024-08-26 PROCEDURE — 99214 OFFICE O/P EST MOD 30 MIN: CPT | Performed by: INTERNAL MEDICINE

## 2024-08-26 RX ORDER — FAMCICLOVIR 500 MG/1
1 TABLET ORAL 3 TIMES DAILY
COMMUNITY
Start: 2024-08-07

## 2024-08-26 NOTE — PROGRESS NOTES
"PCP:  Bonita Colvin APRN Stump, Kathleen, APRN  [address] [place] [address]    Chief Complaint   Patient presents with    Constipation     Follow up constipation        HPI   The patient is a 48-year-old with several issues.  She has constipation.  She takes MiraLAX daily but needs to take laxatives periodically to have a bowel movement.  Otherwise, it would take weeks to have a bowel movement.  She was on Linzess which gave her diarrhea.  She has no family history of colon polyps or cancers.  She did have a colonoscopy 10/1/2018 which was relatively unremarkable.  She had a gastric emptying study which was abnormal showing an element of gastroparesis.  She does have reflux.  It is largely controlled but occasionally she will have breakthroughs.  Upper endoscopy was done on 6/5/2024.  This showed some benign gastric polyps and a small hiatal hernia.  Biopsies were negative for celiac disease.  She did have a TSH last year which was negative.    Allergies   Allergen Reactions    Contrast Dye (Echo Or Unknown Ct/Mr) Anaphylaxis    Iodine Swelling     Ct dye/DROPS BLOOD PRESSURE    Shellfish-Derived Products Swelling     SWELLING/NAUSEA & VOMITING    Meloxicam Rash     Other reaction(s): My doctor told me to stop taking aspirin because of GI upset    Metronidazole GI Intolerance, Diarrhea, Nausea And Vomiting, Other (See Comments) and Rash     2when taken orally and topically localized rash and blisters  BURNING  BURNING  BURNING      Miconazole Nitrate Rash    Oxycodone Rash    Sulfamethoxazole-Trimethoprim Rash          Current Outpatient Medications:     famciclovir (FAMVIR) 500 MG tablet, Take 1 tablet by mouth 3 times a day., Disp: , Rfl:     BD Insulin Syringe U/F 31G X 5/16\" 1 ML misc, USE 1 SYRINGE 4 TIMES DAILY AS DIRECTED, Disp: , Rfl:     Blood Glucose Monitoring Suppl (Accu-Chek Guide Me) w/Device kit, USE TO CHECK GLUCOSE AS DIRECTED THREE TIMES DAILY, Disp: , Rfl:     " cetirizine (ZyrTEC) 10 MG chewable tablet, Zyrtec, Disp: , Rfl:     cyclobenzaprine (FLEXERIL) 10 MG tablet, cyclobenzaprine 10 mg tablet, Disp: , Rfl:     DULoxetine (CYMBALTA) 60 MG capsule, TAKE 1 CAPSULE BY MOUTH ONCE DAILY FOR ANXIETY AND FIBROMYALGIA, Disp: , Rfl:     empagliflozin (Jardiance) 25 MG tablet tablet, Take 1 tablet by mouth Daily., Disp: 60 tablet, Rfl: 1    fluticasone (FLONASE) 50 MCG/ACT nasal spray, Spray 2 sprays every day by intranasal route for 30 days., Disp: , Rfl:     Glucagon 1 MG/0.2ML solution auto-injector, Inject 1 mg under the skin into the appropriate area as directed Every 15 (Fifteen) Minutes As Needed (Hypoglycemia)., Disp: 0.4 mL, Rfl: 3    glucose blood test strip, Use 3/day  DX CODE E 11.9, Disp: , Rfl:     HYDROcodone-acetaminophen (NORCO) 5-325 MG per tablet, Take 1 tablet by mouth Every 6 (Six) Hours As Needed for Severe Pain for up to 12 doses., Disp: 12 tablet, Rfl: 0    Insulin Aspart (novoLOG) 100 UNIT/ML injection, Inject 20 Units under the skin into the appropriate area as directed 3 (Three) Times a Day Before Meals., Disp: 100 mL, Rfl: 3    insulin glargine (LANTUS, SEMGLEE) 100 UNIT/ML injection, Inject 90 Units under the skin into the appropriate area as directed Every Night., Disp: , Rfl:     Lancets 28G misc, lancets  Two times a day, Disp: , Rfl:     naproxen (NAPROSYN) 500 MG tablet, Take 1 tablet by mouth 2 (Two) Times a Day With Meals., Disp: , Rfl:     omeprazole (priLOSEC) 40 MG capsule, omeprazole 40 mg capsule,delayed release  Take 1 capsule by mouth twice daily, Disp: , Rfl:     pravastatin (PRAVACHOL) 10 MG tablet, , Disp: , Rfl:     sucralfate (Carafate) 1 GM/10ML suspension, Take 10 mL by mouth 3 (Three) Times a Day., Disp: 414 mL, Rfl: 3    telmisartan (MICARDIS) 80 MG tablet, Take 1 tablet by mouth Daily., Disp: 90 tablet, Rfl: 3    Tenapanor HCl (Ibsrela) 50 MG tablet, Take 1 tablet by mouth Daily., Disp: 18 tablet, Rfl: 0    Tenapanor HCl  (Ibsrela) 50 MG tablet, Take 1 tablet by mouth Daily. 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 Luna, repeat in 10 years    HYSTERECTOMY  2016    LUMBAR DISCECTOMY      NECK SURGERY  2015    OOPHORECTOMY Right     OVARY SURGERY Bilateral     ovarian drilling    TONSILLECTOMY          Social History     Socioeconomic History    Marital status:    Tobacco Use    Smoking status: Never    Smokeless tobacco: Never   Vaping Use    Vaping status: Never Used   Substance and Sexual Activity    Alcohol use: No    Drug use: No    Sexual activity: Yes     Partners: Male     Birth control/protection: Surgical, Hysterectomy        Family History   Problem Relation Age of Onset    Hypertension Mother     Diabetes Mother     Pancreatitis Father     Prostate cancer Father     Heart disease Father     Breast cancer Paternal Aunt         Late 40's    Breast cancer Paternal Aunt         Early 50's    Breast cancer Paternal Aunt         Late 40's    Ovarian cancer Maternal Grandmother 50    Osteoporosis Maternal Grandmother     Diabetes Maternal Grandmother     Heart disease Maternal  Grandmother     Ovarian cancer Maternal Grandfather 53    Diabetes Maternal Grandfather     Ovarian cancer Other 45        cousins    Endometrial cancer Neg Hx         Review of Systems     There were no vitals filed for this visit.     Physical Exam  Constitutional:       General: She is not in acute distress.     Appearance: Normal appearance. She is not ill-appearing.   Neurological:      Mental Status: She is alert.          Diagnoses and all orders for this visit:    1. Hepatic steatosis (Primary)    2. Chronic idiopathic constipation    3. Gastroparesis    4. Gastroesophageal reflux disease without esophagitis    Impressions and plan #1 hepatic steatosis: We would recommend modest weight reduction and keeping her blood sugars as good as possible.    #2 idiopathic constipation: I am going to suggest a colonoscopy.  It has been 6 years since her last evaluation.  She is due for screening as well.  We are going to start her on some Motegrity 2 mg/day.  I did give her some samples.  If this is helpful we will get her a prescription.      #3 gastroparesis: She apparently has gastroparesis which may be related to her diabetes.  She also had a dysfunctional gallbladder and interesting also constipation.  We are to try a motility agent Motegrity.  Will see if that is not helpful.  I gave her a weeks worth of samples.  It may have some effect on the stomach as well.    Ian Barrett MD

## 2024-08-26 NOTE — LETTER
August 26, 2024       No Recipients    Patient: Bettye Diaz   YOB: 1975   Date of Visit: 8/26/2024     Dear KACY Jaquez:       Thank you for referring Bettye Diaz to me for evaluation. Below are the relevant portions of my assessment and plan of care.    If you have questions, please do not hesitate to call me. I look forward to following Bettye along with you.         Sincerely,        Ian Barrett MD        CC:   No Recipients    Ian Barrett MD  08/26/24 1458  Sign when Signing Visit  PCP:  Bonita Colvin APRN Stump, Kathleen, APRN  [address] [place] [address]    Chief Complaint   Patient presents with   • Constipation     Follow up constipation        HPI   The patient is a 48-year-old with several issues.  She has constipation.  She takes MiraLAX daily but needs to take laxatives periodically to have a bowel movement.  Otherwise, it would take weeks to have a bowel movement.  She was on Linzess which gave her diarrhea.  She has no family history of colon polyps or cancers.  She did have a colonoscopy 10/1/2018 which was relatively unremarkable.  She had a gastric emptying study which was abnormal showing an element of gastroparesis.  She does have reflux.  It is largely controlled but occasionally she will have breakthroughs.  Upper endoscopy was done on 6/5/2024.  This showed some benign gastric polyps and a small hiatal hernia.  Biopsies were negative for celiac disease.  She did have a TSH last year which was negative.    Allergies   Allergen Reactions   • Contrast Dye (Echo Or Unknown Ct/Mr) Anaphylaxis   • Iodine Swelling     Ct dye/DROPS BLOOD PRESSURE   • Shellfish-Derived Products Swelling     SWELLING/NAUSEA & VOMITING   • Meloxicam Rash     Other reaction(s): My doctor told me to stop taking aspirin because of GI upset   • Metronidazole GI Intolerance, Diarrhea, Nausea And Vomiting, Other (See Comments) and Rash      "2when taken orally and topically localized rash and blisters  BURNING  BURNING  BURNING     • Miconazole Nitrate Rash   • Oxycodone Rash   • Sulfamethoxazole-Trimethoprim Rash          Current Outpatient Medications:   •  famciclovir (FAMVIR) 500 MG tablet, Take 1 tablet by mouth 3 times a day., Disp: , Rfl:   •  BD Insulin Syringe U/F 31G X 5/16\" 1 ML misc, USE 1 SYRINGE 4 TIMES DAILY AS DIRECTED, Disp: , Rfl:   •  Blood Glucose Monitoring Suppl (Accu-Chek Guide Me) w/Device kit, USE TO CHECK GLUCOSE AS DIRECTED THREE TIMES DAILY, Disp: , Rfl:   •  cetirizine (ZyrTEC) 10 MG chewable tablet, Zyrtec, Disp: , Rfl:   •  cyclobenzaprine (FLEXERIL) 10 MG tablet, cyclobenzaprine 10 mg tablet, Disp: , Rfl:   •  DULoxetine (CYMBALTA) 60 MG capsule, TAKE 1 CAPSULE BY MOUTH ONCE DAILY FOR ANXIETY AND FIBROMYALGIA, Disp: , Rfl:   •  empagliflozin (Jardiance) 25 MG tablet tablet, Take 1 tablet by mouth Daily., Disp: 60 tablet, Rfl: 1  •  fluticasone (FLONASE) 50 MCG/ACT nasal spray, Spray 2 sprays every day by intranasal route for 30 days., Disp: , Rfl:   •  Glucagon 1 MG/0.2ML solution auto-injector, Inject 1 mg under the skin into the appropriate area as directed Every 15 (Fifteen) Minutes As Needed (Hypoglycemia)., Disp: 0.4 mL, Rfl: 3  •  glucose blood test strip, Use 3/day  DX CODE E 11.9, Disp: , Rfl:   •  HYDROcodone-acetaminophen (NORCO) 5-325 MG per tablet, Take 1 tablet by mouth Every 6 (Six) Hours As Needed for Severe Pain for up to 12 doses., Disp: 12 tablet, Rfl: 0  •  Insulin Aspart (novoLOG) 100 UNIT/ML injection, Inject 20 Units under the skin into the appropriate area as directed 3 (Three) Times a Day Before Meals., Disp: 100 mL, Rfl: 3  •  insulin glargine (LANTUS, SEMGLEE) 100 UNIT/ML injection, Inject 90 Units under the skin into the appropriate area as directed Every Night., Disp: , Rfl:   •  Lancets 28G misc, lancets  Two times a day, Disp: , Rfl:   •  naproxen (NAPROSYN) 500 MG tablet, Take 1 tablet by " mouth 2 (Two) Times a Day With Meals., Disp: , Rfl:   •  omeprazole (priLOSEC) 40 MG capsule, omeprazole 40 mg capsule,delayed release  Take 1 capsule by mouth twice daily, Disp: , Rfl:   •  pravastatin (PRAVACHOL) 10 MG tablet, , Disp: , Rfl:   •  sucralfate (Carafate) 1 GM/10ML suspension, Take 10 mL by mouth 3 (Three) Times a Day., Disp: 414 mL, Rfl: 3  •  telmisartan (MICARDIS) 80 MG tablet, Take 1 tablet by mouth Daily., Disp: 90 tablet, Rfl: 3  •  Tenapanor HCl (Ibsrela) 50 MG tablet, Take 1 tablet by mouth Daily., Disp: 18 tablet, Rfl: 0  •  Tenapanor HCl (Ibsrela) 50 MG tablet, Take 1 tablet by mouth Daily. 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 Luna, repeat in 10 years   • HYSTERECTOMY  2016   • LUMBAR DISCECTOMY     • NECK SURGERY  2015   • OOPHORECTOMY Right    • OVARY SURGERY Bilateral     ovarian drilling   • TONSILLECTOMY          Social History     Socioeconomic History   • Marital status:    Tobacco Use   • Smoking status: Never   • Smokeless tobacco: Never   Vaping Use   • Vaping status: Never Used   Substance and Sexual  Activity   • Alcohol use: No   • Drug use: No   • Sexual activity: Yes     Partners: Male     Birth control/protection: Surgical, Hysterectomy        Family History   Problem Relation Age of Onset   • Hypertension Mother    • Diabetes Mother    • Pancreatitis Father    • Prostate cancer Father    • Heart disease Father    • Breast cancer Paternal Aunt         Late 40's   • Breast cancer Paternal Aunt         Early 50's   • Breast cancer Paternal Aunt         Late 40's   • Ovarian cancer Maternal Grandmother 50   • Osteoporosis Maternal Grandmother    • Diabetes Maternal Grandmother    • Heart disease Maternal Grandmother    • Ovarian cancer Maternal Grandfather 53   • Diabetes Maternal Grandfather    • Ovarian cancer Other 45        cousins   • Endometrial cancer Neg Hx         Review of Systems     There were no vitals filed for this visit.     Physical Exam  Constitutional:       General: She is not in acute distress.     Appearance: Normal appearance. She is not ill-appearing.   Neurological:      Mental Status: She is alert.          Diagnoses and all orders for this visit:    1. Hepatic steatosis (Primary)    2. Chronic idiopathic constipation    3. Gastroparesis    4. Gastroesophageal reflux disease without esophagitis    Impressions and plan #1 hepatic steatosis: We would recommend modest weight reduction and keeping her blood sugars as good as possible.    #2 idiopathic constipation: I am going to suggest a colonoscopy.  It has been 6 years since her last evaluation.  She is due for screening as well.  We are going to start her on some Motegrity 2 mg/day.  I did give her some samples.  If this is helpful we will get her a prescription.      #3 gastroparesis: She apparently has gastroparesis which may be related to her diabetes.  She also had a dysfunctional gallbladder and interesting also constipation.  We are to try a motility agent Motegrity.  Will see if that is not helpful.  I gave her a weeks worth of  samples.  It may have some effect on the stomach as well.    aIn Barrett MD

## 2024-08-27 RX ORDER — PRUCALOPRIDE 2 MG/1
1 TABLET, FILM COATED ORAL DAILY
Qty: 90 TABLET | Refills: 3 | Status: SHIPPED | OUTPATIENT
Start: 2024-08-27

## 2024-08-28 ENCOUNTER — PRIOR AUTHORIZATION (OUTPATIENT)
Dept: GASTROENTEROLOGY | Facility: CLINIC | Age: 49
End: 2024-08-28
Payer: MEDICARE

## 2024-08-29 RX ORDER — SODIUM, POTASSIUM,MAG SULFATES 17.5-3.13G
SOLUTION, RECONSTITUTED, ORAL ORAL
Qty: 354 ML | Refills: 0 | Status: SHIPPED | OUTPATIENT
Start: 2024-08-29

## 2024-09-10 ENCOUNTER — OUTSIDE FACILITY SERVICE (OUTPATIENT)
Dept: GASTROENTEROLOGY | Facility: CLINIC | Age: 49
End: 2024-09-10
Payer: MEDICARE

## 2024-09-10 PROCEDURE — 88305 TISSUE EXAM BY PATHOLOGIST: CPT | Performed by: INTERNAL MEDICINE

## 2024-09-11 ENCOUNTER — LAB REQUISITION (OUTPATIENT)
Dept: LAB | Facility: HOSPITAL | Age: 49
End: 2024-09-11
Payer: MEDICARE

## 2024-09-11 DIAGNOSIS — K59.00 CONSTIPATION, UNSPECIFIED: ICD-10-CM

## 2024-09-11 DIAGNOSIS — Z12.11 ENCOUNTER FOR SCREENING FOR MALIGNANT NEOPLASM OF COLON: ICD-10-CM

## 2024-09-11 DIAGNOSIS — D12.8 BENIGN NEOPLASM OF RECTUM: ICD-10-CM

## 2024-09-18 LAB
NCCN CRITERIA FLAG: ABNORMAL
TYRER CUZICK SCORE: 18.6

## 2024-10-03 ENCOUNTER — HOSPITAL ENCOUNTER (OUTPATIENT)
Dept: MAMMOGRAPHY | Facility: HOSPITAL | Age: 49
Discharge: HOME | End: 2024-10-03
Admitting: NURSE PRACTITIONER
Payer: MEDICARE

## 2024-10-03 DIAGNOSIS — Z12.31 VISIT FOR SCREENING MAMMOGRAM: ICD-10-CM

## 2024-10-03 PROCEDURE — 77067 SCR MAMMO BI INCL CAD: CPT

## 2024-10-03 PROCEDURE — 77063 BREAST TOMOSYNTHESIS BI: CPT

## 2024-10-16 ENCOUNTER — HOSPITAL ENCOUNTER (OUTPATIENT)
Dept: ULTRASOUND IMAGING | Facility: HOSPITAL | Age: 49
Discharge: HOME | End: 2024-10-16
Payer: MEDICARE

## 2024-10-16 ENCOUNTER — HOSPITAL ENCOUNTER (OUTPATIENT)
Dept: MAMMOGRAPHY | Facility: HOSPITAL | Age: 49
Discharge: HOME | End: 2024-10-16
Payer: MEDICARE

## 2024-10-16 DIAGNOSIS — R92.8 ABNORMAL MAMMOGRAM: ICD-10-CM

## 2024-10-16 PROCEDURE — G0279 TOMOSYNTHESIS, MAMMO: HCPCS

## 2024-10-16 PROCEDURE — 77065 DX MAMMO INCL CAD UNI: CPT

## 2024-10-16 PROCEDURE — 76642 ULTRASOUND BREAST LIMITED: CPT

## 2024-10-28 ENCOUNTER — HOSPITAL ENCOUNTER (OUTPATIENT)
Dept: GENERAL RADIOLOGY | Facility: HOSPITAL | Age: 49
Discharge: HOME | End: 2024-10-28
Admitting: NURSE PRACTITIONER
Payer: MEDICARE

## 2024-10-28 ENCOUNTER — TRANSCRIBE ORDERS (OUTPATIENT)
Dept: GENERAL RADIOLOGY | Facility: HOSPITAL | Age: 49
End: 2024-10-28
Payer: MEDICARE

## 2024-10-28 DIAGNOSIS — M79.644 FINGER PAIN, RIGHT: Primary | ICD-10-CM

## 2024-10-28 DIAGNOSIS — M79.644 FINGER PAIN, RIGHT: ICD-10-CM

## 2024-10-28 PROCEDURE — 73130 X-RAY EXAM OF HAND: CPT

## 2024-11-26 ENCOUNTER — OFFICE VISIT (OUTPATIENT)
Age: 49
End: 2024-11-26
Payer: MEDICARE

## 2024-11-26 ENCOUNTER — LAB (OUTPATIENT)
Facility: HOSPITAL | Age: 49
End: 2024-11-26
Payer: MEDICARE

## 2024-11-26 VITALS
BODY MASS INDEX: 36.12 KG/M2 | HEIGHT: 64 IN | TEMPERATURE: 97.6 F | DIASTOLIC BLOOD PRESSURE: 86 MMHG | SYSTOLIC BLOOD PRESSURE: 142 MMHG | HEART RATE: 100 BPM | WEIGHT: 211.6 LBS

## 2024-11-26 DIAGNOSIS — G89.4 CHRONIC PAIN SYNDROME: ICD-10-CM

## 2024-11-26 DIAGNOSIS — R53.83 OTHER FATIGUE: ICD-10-CM

## 2024-11-26 DIAGNOSIS — M25.50 ARTHRALGIA OF MULTIPLE SITES: ICD-10-CM

## 2024-11-26 DIAGNOSIS — Z98.1 S/P LUMBAR FUSION: ICD-10-CM

## 2024-11-26 DIAGNOSIS — K76.0 FATTY LIVER: ICD-10-CM

## 2024-11-26 DIAGNOSIS — M15.9 GENERALIZED OSTEOARTHROSIS, INVOLVING MULTIPLE SITES: ICD-10-CM

## 2024-11-26 DIAGNOSIS — R76.8 RHEUMATOID FACTOR POSITIVE: Chronic | ICD-10-CM

## 2024-11-26 DIAGNOSIS — Z98.1 S/P CERVICAL SPINAL FUSION: ICD-10-CM

## 2024-11-26 DIAGNOSIS — M79.7 FIBROMYALGIA: ICD-10-CM

## 2024-11-26 DIAGNOSIS — R76.8 RHEUMATOID FACTOR POSITIVE: Primary | Chronic | ICD-10-CM

## 2024-11-26 DIAGNOSIS — Z86.39 HISTORY OF DIABETES MELLITUS: ICD-10-CM

## 2024-11-26 LAB
ALBUMIN SERPL-MCNC: 3.8 G/DL (ref 3.5–5.2)
ALBUMIN/GLOB SERPL: 1.3 G/DL
ALP SERPL-CCNC: 58 U/L (ref 39–117)
ALT SERPL W P-5'-P-CCNC: 25 U/L (ref 1–33)
ANION GAP SERPL CALCULATED.3IONS-SCNC: 9.3 MMOL/L (ref 5–15)
AST SERPL-CCNC: 18 U/L (ref 1–32)
BASOPHILS # BLD AUTO: 0.07 10*3/MM3 (ref 0–0.2)
BASOPHILS NFR BLD AUTO: 0.9 % (ref 0–1.5)
BILIRUB SERPL-MCNC: 0.3 MG/DL (ref 0–1.2)
BUN SERPL-MCNC: 13 MG/DL (ref 6–20)
BUN/CREAT SERPL: 19.1 (ref 7–25)
CALCIUM SPEC-SCNC: 9.1 MG/DL (ref 8.6–10.5)
CHLORIDE SERPL-SCNC: 104 MMOL/L (ref 98–107)
CO2 SERPL-SCNC: 25.7 MMOL/L (ref 22–29)
CREAT SERPL-MCNC: 0.68 MG/DL (ref 0.57–1)
CRP SERPL-MCNC: 0.37 MG/DL (ref 0–0.5)
DEPRECATED RDW RBC AUTO: 39.7 FL (ref 37–54)
EGFRCR SERPLBLD CKD-EPI 2021: 107.6 ML/MIN/1.73
EOSINOPHIL # BLD AUTO: 0.24 10*3/MM3 (ref 0–0.4)
EOSINOPHIL NFR BLD AUTO: 3.2 % (ref 0.3–6.2)
ERYTHROCYTE [DISTWIDTH] IN BLOOD BY AUTOMATED COUNT: 13.1 % (ref 12.3–15.4)
ERYTHROCYTE [SEDIMENTATION RATE] IN BLOOD: 26 MM/HR (ref 0–20)
GLOBULIN UR ELPH-MCNC: 3 GM/DL
GLUCOSE SERPL-MCNC: 128 MG/DL (ref 65–99)
HCT VFR BLD AUTO: 43.4 % (ref 34–46.6)
HGB BLD-MCNC: 14.6 G/DL (ref 12–15.9)
IMM GRANULOCYTES # BLD AUTO: 0.04 10*3/MM3 (ref 0–0.05)
IMM GRANULOCYTES NFR BLD AUTO: 0.5 % (ref 0–0.5)
LYMPHOCYTES # BLD AUTO: 2.05 10*3/MM3 (ref 0.7–3.1)
LYMPHOCYTES NFR BLD AUTO: 27.7 % (ref 19.6–45.3)
MCH RBC QN AUTO: 28.9 PG (ref 26.6–33)
MCHC RBC AUTO-ENTMCNC: 33.6 G/DL (ref 31.5–35.7)
MCV RBC AUTO: 85.8 FL (ref 79–97)
MONOCYTES # BLD AUTO: 0.45 10*3/MM3 (ref 0.1–0.9)
MONOCYTES NFR BLD AUTO: 6.1 % (ref 5–12)
NEUTROPHILS NFR BLD AUTO: 4.54 10*3/MM3 (ref 1.7–7)
NEUTROPHILS NFR BLD AUTO: 61.6 % (ref 42.7–76)
NRBC BLD AUTO-RTO: 0 /100 WBC (ref 0–0.2)
PLATELET # BLD AUTO: 257 10*3/MM3 (ref 140–450)
PMV BLD AUTO: 9.6 FL (ref 6–12)
POTASSIUM SERPL-SCNC: 4 MMOL/L (ref 3.5–5.2)
PROT SERPL-MCNC: 6.8 G/DL (ref 6–8.5)
RBC # BLD AUTO: 5.06 10*6/MM3 (ref 3.77–5.28)
SODIUM SERPL-SCNC: 139 MMOL/L (ref 136–145)
WBC NRBC COR # BLD AUTO: 7.39 10*3/MM3 (ref 3.4–10.8)

## 2024-11-26 PROCEDURE — 3077F SYST BP >= 140 MM HG: CPT | Performed by: INTERNAL MEDICINE

## 2024-11-26 PROCEDURE — 85652 RBC SED RATE AUTOMATED: CPT

## 2024-11-26 PROCEDURE — 1159F MED LIST DOCD IN RCRD: CPT | Performed by: INTERNAL MEDICINE

## 2024-11-26 PROCEDURE — 86200 CCP ANTIBODY: CPT

## 2024-11-26 PROCEDURE — 80074 ACUTE HEPATITIS PANEL: CPT

## 2024-11-26 PROCEDURE — 80053 COMPREHEN METABOLIC PANEL: CPT

## 2024-11-26 PROCEDURE — 99204 OFFICE O/P NEW MOD 45 MIN: CPT | Performed by: INTERNAL MEDICINE

## 2024-11-26 PROCEDURE — 3079F DIAST BP 80-89 MM HG: CPT | Performed by: INTERNAL MEDICINE

## 2024-11-26 PROCEDURE — 36415 COLL VENOUS BLD VENIPUNCTURE: CPT

## 2024-11-26 PROCEDURE — 85025 COMPLETE CBC W/AUTO DIFF WBC: CPT

## 2024-11-26 PROCEDURE — 86431 RHEUMATOID FACTOR QUANT: CPT

## 2024-11-26 PROCEDURE — 86140 C-REACTIVE PROTEIN: CPT

## 2024-11-26 PROCEDURE — 1160F RVW MEDS BY RX/DR IN RCRD: CPT | Performed by: INTERNAL MEDICINE

## 2024-11-26 NOTE — PROGRESS NOTES
Office Visit       Date: 11/26/2024   Patient Name: Bettye Diaz  MRN: [id number]  YOB: 1975    Referring Physician: Michelle Hanks APRN     Chief Complaint: Joint pain  Chief Complaint   Patient presents with    Abnormal Lab     Positive Rheumatoid Factor       History of Present Illness: Bettye Diaz is a 49 y.o. female with history of chronic pain, fibromyalgia, osteoarthritis, diabetes, hypertension, fatty liver who is here today in consultation from her PCP for positive rheumatoid factor 14    Patient reports longstanding widespread musculoskeletal pain.  She notes new deformities in the DIP joints in the past year.  No swollen PIP or MCP joints.  Her wrists ache.  She aches all over in the muscles.  Spine feels stiff and sore.  Her hips ache and feel like she is walking on concrete.  She today has had difficulty walking significant distances because of pain in her low back and hips.  She has not currently taking an NSAID.  She has previously been intolerant to meloxicam.  She is on duloxetine regularly.  She previously failed gabapentin.  She has tried physical therapy.  She has consulted with pain management Dr. Isrrael Sosa and neurosurgery Dr. Long previously.  There was consideration for possible spinal stimulator, but she was not really interested at the time    Her sister has rheumatoid arthritis and follows with my partner Dr. Lobato    She reports chills, fatigue, leg swelling, palpitations, cold intolerance, back pain, gait problems, joint swelling, myalgias, neck pain and stiffness, bruising, dry skin, headaches, nervousness/anxiety, sleep disturbance.    Denies Raynaud's, malar rash, photosensitivity, oral nasal ulcers, pleurisy/pericarditis, renal/hematologic abnormality, clotting disorder, seizure disorder, iritis, psoriasis    Therapies tried Meloxicam, Naproxen, Duloxetine, Gabapentin, PT, hot tub    Specialists neurosurgery Dr Vineet Long [facility],  Pain Management Dr Sosa    Labs 10/28/2024: Normal CBC/CMP/TSH, sed rate 13, +rheumatoid factor 14.7, negative EDYTA, uric acid 5.5, vitamin D 36, A1c 8.9, negative urinalysis  X-ray right hand 10/30/2024: No evidence of fracture or malalignment  X-ray lumbar spine 11/7/2024: Degenerative changes and lumbar fusion    Subjective     Review of Systems: Review of Systems   Constitutional:  Negative for chills, fatigue, fever and unexpected weight loss.   HENT:  Positive for sinus pressure. Negative for mouth sores and sore throat.    Eyes:  Negative for pain and redness.   Respiratory:  Negative for cough and shortness of breath.    Cardiovascular:  Negative for chest pain.   Gastrointestinal:  Positive for GERD. Negative for abdominal pain, blood in stool, diarrhea, nausea and vomiting.   Endocrine: Negative for polydipsia and polyuria.   Genitourinary:  Negative for dysuria, genital sores and hematuria.   Musculoskeletal:  Positive for back pain and joint swelling. Negative for arthralgias, myalgias, neck pain and neck stiffness.   Skin:  Negative for rash and bruise.   Allergic/Immunologic: Negative for immunocompromised state.   Neurological:  Negative for seizures, weakness, numbness and memory problem.   Hematological:  Negative for adenopathy. Does not bruise/bleed easily.   Psychiatric/Behavioral:  Negative for depressed mood. The patient is not nervous/anxious.         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 Luna, repeat in 10 years    HYSTERECTOMY  2016    LUMBAR DISCECTOMY      NECK SURGERY  2015    OOPHORECTOMY Right     OVARY SURGERY Bilateral     ovarian drilling    TONSILLECTOMY         Family History:   Family History   Problem Relation Age of Onset    Hypertension Mother     Diabetes Mother     Kidney cancer Mother     Cancer Father         Prostate    Pancreatitis Father     Prostate cancer Father     Heart disease Father     Rheum arthritis Sister     Breast cancer Paternal Aunt         Late 40's    Breast cancer Paternal Aunt         Early 50's    Breast cancer Paternal Aunt         Late 40's    Ovarian cancer Maternal Grandmother 50    Osteoporosis Maternal Grandmother     Diabetes Maternal Grandmother     Heart disease Maternal Grandmother     Ovarian cancer Maternal Grandfather 53    Diabetes Maternal Grandfather     Ovarian cancer Other 45        cousins    Endometrial cancer Neg Hx        Social History:   Social History     Socioeconomic History    Marital status:    Tobacco Use    Smoking status: Never    Smokeless tobacco: Never   Vaping Use    Vaping status: Never Used   Substance and Sexual Activity    Alcohol use: No    Drug use: No    Sexual activity: Yes     Partners: Male     Birth control/protection: Surgical, Hysterectomy       Medications:   Current Outpatient Medications:     BD Insulin Syringe U/F 31G X 5/16\" 1 ML misc, USE 1 SYRINGE 4 TIMES DAILY AS DIRECTED, Disp: , Rfl:     Blood Glucose Monitoring Suppl (Accu-Chek Guide Me) w/Device kit, USE TO CHECK GLUCOSE AS DIRECTED THREE TIMES DAILY, Disp: , Rfl:     cetirizine (ZyrTEC) 10 MG chewable tablet, Chew 1 tablet Daily As Needed for Allergies., Disp: , Rfl:     DULoxetine (CYMBALTA) 60 MG capsule, TAKE 1 CAPSULE " BY MOUTH ONCE DAILY FOR ANXIETY AND FIBROMYALGIA, Disp: , Rfl:     fluticasone (FLONASE) 50 MCG/ACT nasal spray, Spray 2 sprays every day by intranasal route for 30 days., Disp: , Rfl:     Glucagon 1 MG/0.2ML solution auto-injector, Inject 1 mg under the skin into the appropriate area as directed Every 15 (Fifteen) Minutes As Needed (Hypoglycemia)., Disp: 0.4 mL, Rfl: 3    glucose blood test strip, Use 3/day  DX CODE E 11.9, Disp: , Rfl:     Insulin Aspart (novoLOG) 100 UNIT/ML injection, Inject 20 Units under the skin into the appropriate area as directed 3 (Three) Times a Day Before Meals., Disp: 100 mL, Rfl: 3    insulin glargine (LANTUS, SEMGLEE) 100 UNIT/ML injection, Inject 90 Units under the skin into the appropriate area as directed Every Night., Disp: , Rfl:     Lancets 28G misc, lancets  Two times a day, Disp: , Rfl:     metFORMIN ER (GLUCOPHAGE-XR) 500 MG 24 hr tablet, Take 2 tablets by mouth Daily., Disp: , Rfl:     omeprazole (priLOSEC) 40 MG capsule, Take 1 capsule by mouth Daily., Disp: , Rfl:     telmisartan (MICARDIS) 80 MG tablet, Take 1 tablet by mouth Daily., Disp: 90 tablet, Rfl: 3    nabumetone (RELAFEN) 750 MG tablet, Take 1 tablet by mouth 2 (Two) Times a Day As Needed for Mild Pain., Disp: 60 tablet, Rfl: 5    Allergies:   Allergies   Allergen Reactions    Contrast Dye (Echo Or Unknown Ct/Mr) Anaphylaxis    Iodinated Contrast Media Anaphylaxis    Iodine Swelling     Ct dye/DROPS BLOOD PRESSURE    Shellfish-Derived Products Swelling     SWELLING/NAUSEA & VOMITING    Meloxicam Rash     Other reaction(s): My doctor told me to stop taking aspirin because of GI upset    Metronidazole GI Intolerance, Diarrhea, Nausea And Vomiting, Other (See Comments) and Rash     2when taken orally and topically localized rash and blisters  BURNING  BURNING  BURNING      Miconazole Nitrate Rash    Oxycodone Rash    Sulfamethoxazole-Trimethoprim Rash       I have reviewed and updated the patient's chief complaint,  "history of present illness, review of systems, past medical history, surgical history, family history, social history, medications and allergy list as appropriate.     Objective      Vital Signs:   Vitals:    11/26/24 0855   BP: 142/86   BP Location: Left arm   Patient Position: Sitting   Cuff Size: Adult   Pulse: 100   Temp: 97.6 °F (36.4 °C)   Weight: 96 kg (211 lb 9.6 oz)   Height: 162.6 cm (64\")   PainSc: 10-Worst pain ever     Body mass index is 36.32 kg/m².       Physical Exam:  Physical Exam   MUSCULOSKELETAL:   No peripheral synovitis.  No dactylitis.  No pitting of the nails.  Scattered Heberden nodules present DIP joints.  Mucoid cyst present over 1 DIP joint.  No rheumatoid nodules or tophi  Tender cervical thoracic lumbar spine.  Tender diminished range of motion internal/external rotation left hip  Tender right hip with good internal/external rotation  Slight crepitus with range of motion knees.  No warmth or effusion.  No synovitis ankles  Widespread tender points present above and below the waist.  Diffuse allodynia present      Complete joint exam was performed including the MCPs, PIPs, DIPs of the hands, wrists, elbows, shoulders, hips, knees and ankles.  No soft tissue swelling or tenderness is present except as above.    General: The patient is well-developed and well nourished. Cooperative, alert and oriented. Affect is normal. Hydration appears normal.   HEENT: Normocephalic and atraumatic. Lids and conjunctiva are normal. Pupils are equal and sclera are clear. Oropharynx is clear   NECK neck is supple without adenopathy, masses or thyromegaly.   CARDIOVASCULAR: Regular rate and rhythm. No murmurs, rubs or gallops   LUNGS: Effort is normal. Lungs are clear bilateral   ABDOMEN: Not examined  EXTREMITIES: Peripheral pulses are intact. No clubbing.   SKIN: No rashes. No subcutaneous nodules. No digital ulcers. No sclerodactyly.   NEUROLOGIC: Gait is normal. Strength testing is normal.  No focal " "neurologic deficits    Results Review:   Labs:    Lab Results   Component Value Date    GLUCOSE CANCELED 07/12/2023    BUN 14 07/12/2023    CREATININE 0.66 07/12/2023    EGFRRESULT 109 07/12/2023    EGFR 98.0 03/29/2023    BCR 21 07/12/2023    K CANCELED 07/12/2023    CO2 16 (L) 07/12/2023    CALCIUM 8.9 07/12/2023    PROTENTOTREF 6.5 07/12/2023    ALBUMIN 4.1 07/12/2023    BILITOT 0.5 07/12/2023    AST 21 07/12/2023    ALT 27 07/12/2023     Lab Results   Component Value Date    WBC 7.07 01/13/2024    HGB 14.9 01/13/2024    HCT 45.8 (H) 01/13/2024    MCV 85 01/13/2024     01/13/2024     Lab Results   Component Value Date    SEDRATE 30 (H) 05/04/2023     No results found for: \"CRP\"  No results found for: \"QUANTIFERO\", \"QUANTITB1\", \"QUANTITB2\", \"QUANTIFERN\", \"QUANTIFERM\", \"QUANTITBGLDP\"  No results found for: \"RF\"  Lab Results   Component Value Date    HEPCVIRUSABY Non Reactive 03/24/2023           Procedures    Assessment / Plan      Assessment & Plan  Rheumatoid factor positive  Sister with RA follows with Dr. Lobato  Labs 10/28/2024: Normal CBC/CMP/TSH, sed rate 13, +rheumatoid factor 14.7, negative EDYTA, uric acid 5.5, vitamin D 36, hemoglobin A1c 8.9, negative urinalysis  X-ray right hand 10/30/2024: No evidence of fracture or malalignment  X-ray lumbar spine 11/7/2024: Degenerative changes and lumbar fusion    Therapies tried Meloxicam, Naproxen, Duloxetine, Gabapentin, PT, hot tub    *Current Rx: duloxetine, nabumetone  Specialists [facility] neurosurgery Dr Vineet Long, Pain Management Dr Sosa / Dr. Pacheco      49-year-old female with history of chronic pain, fibromyalgia, generalized osteoarthritis, status post cervical and lumbar spine fusion, diabetes, fatty liver seen today in consultation from PCP for weakly positive rheumatoid factor 14.7.  Her sister has rheumatoid arthritis and follows with my partner Dr. Lobato. She notes pain and bony deformities in the DIP joints.  She aches all over her body " and her muscles.  She feels her hips are sore and hard to move.  She has chronic pain in her spine    Discussion  Probable false positive weakly positive rheumatoid factor with chronic pain secondary to fibromyalgia and osteoarthritis  -At this time she does not meet criteria for rheumatoid arthritis, as she has no peripheral synovitis/no swollen joints of RA  -Labs have shown normal inflammatory marker sedimentation rate.    -X-ray right hand showed no evidence of inflammatory arthritis  No clinical features of lupus or connective tissue disease.  EDYTA is negative.  No evidence of psoriatic arthritis  Ankylosing spondylitis/inflammatory spine disease is a consideration with her chronic back pain at a young age.      Recommendations  -Further investigation with labs ordered as below and x-rays ordered hands ,sacroiliac joints, and hips today  -Stop any over-the-counter NSAID and start nabumetone 750 mg twice daily as needed for OA pain  -Continue on duloxetine which is FDA approved for fibromyalgia pain  -Handouts provided on fibromyalgia and osteoarthritis  -Consult with pain management Dr. Pacheco for chronic pain.    -Consider future trial of pregabalin through PCP or pain management   -Reassurance given that she does not presently meet criteria for rheumatoid arthritis, and that I believe her weakly positive rheumatoid factor is a false positive finding.  We discussed clinical signs of rheumatoid arthritis such as persistent swollen joint in the MCP PIP joints hands or wrists for over 6 weeks.  She will let me immediately if she were to develop this clinical finding  .  -Overall low suspicion of systemic inflammatory rheumatic disease / RA at this time.  I will have the patient return on an as-needed basis and continue to follow with their primary provider and neurosurgeon Dr. Long.  Patient understands that should they have any rheumatologic concerns in the future to give us a call and I will be happy to  re-evaluate.  It was a pleasure to see the patient in clinic today.  Thank you for allowing me to participate in the care of this patient          Orders:    CBC Auto Differential; Future    Comprehensive Metabolic Panel; Future    C-reactive Protein; Future    Sedimentation Rate; Future    Cyclic Citrul Peptide Antibody, IgG / IgA; Future    Rheumatoid Factor; Future    Urinalysis With Culture If Indicated -; Future    HLA-B27 Antigen; Future    XR Hand 2 View Bilateral    XR Sacroiliac Joints 3+ View    Arthralgia of multiple sites    Orders:    CBC Auto Differential; Future    Comprehensive Metabolic Panel; Future    C-reactive Protein; Future    Sedimentation Rate; Future    Cyclic Citrul Peptide Antibody, IgG / IgA; Future    Rheumatoid Factor; Future    Urinalysis With Culture If Indicated -; Future    HLA-B27 Antigen; Future    XR Hand 2 View Bilateral    XR Sacroiliac Joints 3+ View    XR Hips Bilateral With or Without Pelvis 2 View    Fibromyalgia  The symptoms are predominantly neuropathic. Patient description of joints are most consistent with neuropathic symptoms/dysesthesias seen in a variety of neuropathic states including Fibromyalgia. Neuropathic medications form the basis of treatment for these conditions. Narcotics have no role in the treatment of neuropathic pain. I explained to her that neuropathic syndromes are generally long term syndromes that don't go away.     I encouraged regular low-impact exercise up to 30 minutes/day.  If this is unattainable, recommended graded exercise program starting with 5 minutes of dedicated cardiovascular exercise daily, increasing by 1 minute daily until goal of 30 minutes daily is reached.    -Recommend conservative measures to improve sleep  -Consider referral to sleep medicine for SALLY screening-   -Counseled on alternative therapies that can help with chronic pain including massage therapy, yoga, isra chi    Orders:    Ambulatory Referral to Pain Management  Clinic    Chronic pain syndrome    Orders:    Ambulatory Referral to Pain Management Clinic    Generalized osteoarthrosis, involving multiple sites  -Recommend as needed NSAID  Risks of NSAIDs discussed including GI upset, GI bleeding, renal or hepatic risks and the risk of cardiovascular disease and stroke.  Warned patient not to take other NSAIDs including over-the-counter NSAIDs  -Weight loss through Mediterranean diet         S/P lumbar fusion         S/P cervical spinal fusion         History of diabetes mellitus         Fatty liver         Other fatigue    Orders:    Hepatitis Panel, Acute; Future    TIME SPENT: I spent 45 minutes caring for the patient on this date of service.  This time includes time spent by me in the following activities: Preparing for the visit, obtaining records, reviewing/ordering tests and independently reviewing results, performing a medically appropriate history/exam, counseling and educating the patient/family/caregiver, ordering medications, tests, or procedures, and documenting information in the medical record.    Follow Up:   Return if symptoms worsen or fail to improve.        Daryn Harvey MD  [facility] [place]

## 2024-11-26 NOTE — ASSESSMENT & PLAN NOTE
The symptoms are predominantly neuropathic. Patient description of joints are most consistent with neuropathic symptoms/dysesthesias seen in a variety of neuropathic states including Fibromyalgia. Neuropathic medications form the basis of treatment for these conditions. Narcotics have no role in the treatment of neuropathic pain. I explained to her that neuropathic syndromes are generally long term syndromes that don't go away.     I encouraged regular low-impact exercise up to 30 minutes/day.  If this is unattainable, recommended graded exercise program starting with 5 minutes of dedicated cardiovascular exercise daily, increasing by 1 minute daily until goal of 30 minutes daily is reached.    -Recommend conservative measures to improve sleep  -Consider referral to sleep medicine for SALLY screening-   -Counseled on alternative therapies that can help with chronic pain including massage therapy, yoga, isra chi    Orders:    Ambulatory Referral to Pain Management Clinic

## 2024-11-26 NOTE — ASSESSMENT & PLAN NOTE
Sister with RA follows with Dr. Lobato  Labs 10/28/2024: Normal CBC/CMP/TSH, sed rate 13, +rheumatoid factor 14.7, negative EDYTA, uric acid 5.5, vitamin D 36, hemoglobin A1c 8.9, negative urinalysis  X-ray right hand 10/30/2024: No evidence of fracture or malalignment  X-ray lumbar spine 11/7/2024: Degenerative changes and lumbar fusion    Therapies tried Meloxicam, Naproxen, Duloxetine, Gabapentin, PT, hot tub    *Current Rx: duloxetine, nabumetone  Specialists [facility] neurosurgery Dr Vineet Long, Pain Management Dr Sosa / Dr. Pacheco      49-year-old female with history of chronic pain, fibromyalgia, generalized osteoarthritis, status post cervical and lumbar spine fusion, diabetes, fatty liver seen today in consultation from PCP for weakly positive rheumatoid factor 14.7.  Her sister has rheumatoid arthritis and follows with my partner Dr. Lobato. She notes pain and bony deformities in the DIP joints.  She aches all over her body and her muscles.  She feels her hips are sore and hard to move.  She has chronic pain in her spine    Discussion  Probable false positive weakly positive rheumatoid factor with chronic pain secondary to fibromyalgia and osteoarthritis  -At this time she does not meet criteria for rheumatoid arthritis, as she has no peripheral synovitis/no swollen joints of RA  -Labs have shown normal inflammatory marker sedimentation rate.    -X-ray right hand showed no evidence of inflammatory arthritis  No clinical features of lupus or connective tissue disease.  EDYTA is negative.  No evidence of psoriatic arthritis  Ankylosing spondylitis/inflammatory spine disease is a consideration with her chronic back pain at a young age.      Recommendations  -Further investigation with labs ordered as below and x-rays ordered hands ,sacroiliac joints, and hips today  -Stop any over-the-counter NSAID and start nabumetone 750 mg twice daily as needed for OA pain  -Continue on duloxetine which is FDA approved for  fibromyalgia pain  -Handouts provided on fibromyalgia and osteoarthritis  -Consult with pain management Dr. Pacheco for chronic pain.    -Consider future trial of pregabalin through PCP or pain management   -Reassurance given that she does not presently meet criteria for rheumatoid arthritis, and that I believe her weakly positive rheumatoid factor is a false positive finding.  We discussed clinical signs of rheumatoid arthritis such as persistent swollen joint in the MCP PIP joints hands or wrists for over 6 weeks.  She will let me immediately if she were to develop this clinical finding  .  -Overall low suspicion of systemic inflammatory rheumatic disease / RA at this time.  I will have the patient return on an as-needed basis and continue to follow with their primary provider and neurosurgeon Dr. Long.  Patient understands that should they have any rheumatologic concerns in the future to give us a call and I will be happy to re-evaluate.  It was a pleasure to see the patient in clinic today.  Thank you for allowing me to participate in the care of this patient          Orders:    CBC Auto Differential; Future    Comprehensive Metabolic Panel; Future    C-reactive Protein; Future    Sedimentation Rate; Future    Cyclic Citrul Peptide Antibody, IgG / IgA; Future    Rheumatoid Factor; Future    Urinalysis With Culture If Indicated -; Future    HLA-B27 Antigen; Future    XR Hand 2 View Bilateral    XR Sacroiliac Joints 3+ View

## 2024-11-26 NOTE — ASSESSMENT & PLAN NOTE
-Recommend as needed NSAID  Risks of NSAIDs discussed including GI upset, GI bleeding, renal or hepatic risks and the risk of cardiovascular disease and stroke.  Warned patient not to take other NSAIDs including over-the-counter NSAIDs  -Weight loss through Mediterranean diet

## 2024-11-26 NOTE — ASSESSMENT & PLAN NOTE
Orders:    CBC Auto Differential; Future    Comprehensive Metabolic Panel; Future    C-reactive Protein; Future    Sedimentation Rate; Future    Cyclic Citrul Peptide Antibody, IgG / IgA; Future    Rheumatoid Factor; Future    Urinalysis With Culture If Indicated -; Future    HLA-B27 Antigen; Future    XR Hand 2 View Bilateral    XR Sacroiliac Joints 3+ View    XR Hips Bilateral With or Without Pelvis 2 View

## 2024-11-26 NOTE — PATIENT INSTRUCTIONS
"Osteoarthritis    Osteoarthritis is a type of arthritis. It refers to joint pain or joint disease. Osteoarthritis affects tissue that covers the ends of bones in joints (cartilage). Cartilage acts as a cushion between the bones and helps them move smoothly. Osteoarthritis occurs when cartilage in the joints gets worn down. Osteoarthritis is sometimes called \"wear and tear\" arthritis.  Osteoarthritis is the most common form of arthritis. It often occurs in older people. It is a condition that gets worse over time. The joints most often affected by this condition are in the fingers, toes, hips, knees, and spine, including the neck and lower back.    What are the causes?  This condition is caused by the wearing down of cartilage that covers the ends of bones.    What increases the risk?  The following factors may make you more likely to develop this condition:  Being age 50 or older.  Obesity.  Overuse of joints.  Past injury of a joint.  Past surgery on a joint.  Family history of osteoarthritis.    What are the signs or symptoms?  The main symptoms of this condition are pain, swelling, and stiffness in the joint. Other symptoms may include:  An enlarged joint.  More pain and further damage caused by small pieces of bone or cartilage that break off and float inside of the joint.  Small deposits of bone (osteophytes) that grow on the edges of the joint.  A grating or scraping feeling inside the joint when you move it.  Popping or creaking sounds when you move.  Difficulty walking or exercising.  An inability to  items, twist your hand, or control the movements of your hands and fingers.    How is this diagnosed?  This condition may be diagnosed based on:  Your medical history.  A physical exam.  Your symptoms.  X-rays of the affected joints.  Blood tests to rule out other types of arthritis.    How is this treated?  There is no cure for this condition, but treatment can help control pain and improve joint function. " Treatment may include a combination of therapies, such as:  Pain relief techniques, such as:  Applying heat and cold to the joint.  Massage.  A form of talk therapy called cognitive behavioral therapy (CBT). This therapy helps you set goals and follow up on the changes that you make.  Medicines for pain and inflammation. The medicines can be taken by mouth or applied to the skin. They include:  NSAIDs, such as ibuprofen.  Prescription medicines.  Strong anti-inflammatory medicines (corticosteroids).  Certain nutritional supplements.  A prescribed exercise program. You may work with a physical therapist.  Assistive devices, such as a brace, wrap, splint, specialized glove, or cane.  A weight control plan.  Surgery, such as:  An osteotomy. This is done to reposition the bones and relieve pain or to remove loose pieces of bone and cartilage.  Joint replacement surgery. You may need this surgery if you have advanced osteoarthritis.    Follow these instructions at home:    Activity  Rest your affected joints as told by your health care provider.  Exercise as told by your provider. The provider may recommend specific types of exercise, such as:  Strengthening exercises. These are done to strengthen the muscles that support joints affected by arthritis.  Aerobic activities. These are exercises, such as brisk walking or water aerobics, that increase your heart rate.  Range-of-motion activities. These help your joints move more easily.  Balance and agility exercises.    Managing pain, stiffness, and swelling         If told, apply heat to the affected area as often as told by your provider. Use the heat source that your provider recommends, such as a moist heat pack or a heating pad.  If you have a removable assistive device, remove it as told by your provider.  Place a towel between your skin and the heat source. If your provider tells you to keep the assistive device on while you apply heat, place a towel between the  assistive device and the heat source.  Leave the heat on for 20-30 minutes.  If told, put ice on the affected area.  If you have a removable assistive device, remove it as told by your provider.  Put ice in a plastic bag.  Place a towel between your skin and the bag. If your provider tells you to keep the assistive device on during icing, place a towel between the assistive device and the bag.  Leave the ice on for 20 minutes, 2-3 times a day.  If your skin turns bright red, remove the ice or heat right away to prevent skin damage. The risk of damage is higher if you cannot feel pain, heat, or cold.  Move your fingers or toes often to reduce stiffness and swelling.  Raise (elevate) the affected area above the level of your heart while you are sitting or lying down.    General instructions  Take over-the-counter and prescription medicines only as told by your provider.  Maintain a healthy weight. Follow instructions from your provider for weight control.  Do not use any products that contain nicotine or tobacco. These products include cigarettes, chewing tobacco, and vaping devices, such as e-cigarettes. If you need help quitting, ask your provider.  Use assistive devices as told by your provider.    Where to find more information  National [place] of Arthritis and Musculoskeletal and Skin Diseases: niams.nih.gov  National [place] on Aging: [place].nih.gov  American College of Rheumatology: rheumatology.org    Contact a health care provider if:  You have redness, swelling, or a feeling of warmth in a joint that gets worse.  You have a fever along with joint or muscle aches.  You develop a rash.  You have trouble doing your normal activities.  You have pain that gets worse and is not relieved by pain medicine.    This information is not intended to replace advice given to you by your health care provider. Make sure you discuss any questions you have with your health care provider.  Document Revised: 08/17/2023  Document Reviewed: 08/17/2023  [employer] Patient Education © 2024 [employer] Myofascial Pain Syndrome and Fibromyalgia    Myofascial pain syndrome and fibromyalgia are both pain disorders. You may feel this pain mainly in your muscles.  Myofascial pain syndrome:  Always has tender points in the muscles that will cause pain when pressed (trigger points). The pain may come and go.  Usually affects your neck, upper back, and shoulder areas. The pain often moves into your arms and hands.  Fibromyalgia:  Has muscle pains and tenderness that come and go.  Is often associated with tiredness (fatigue) and sleep problems.  Has trigger points.  Tends to be long-lasting (chronic), but is not life-threatening.  Fibromyalgia and myofascial pain syndrome are not the same. However, they often occur together. If you have both conditions, each can make the other worse. Both are common and can cause enough pain and fatigue to make day-to-day activities difficult. Both can be hard to diagnose because their symptoms are common in many other conditions.    What are the causes?  The exact causes of these conditions are not known.    What increases the risk?  You are more likely to develop either of these conditions if:  You have a family history of the condition.  You are female.  You have certain triggers, such as:  Spine disorders.  An injury (trauma) or other physical stressors.  Being under a lot of stress.  Medical conditions such as osteoarthritis, rheumatoid arthritis, or lupus.    What are the signs or symptoms?    Fibromyalgia  The main symptom of fibromyalgia is widespread pain and tenderness in your muscles. Pain is sometimes described as stabbing, shooting, or burning.  You may also have:  Tingling or numbness.  Sleep problems and fatigue.  Problems with attention and concentration (fibro fog).  Other symptoms may include:  Bowel and bladder problems.  Headaches.  Vision problems.  Sensitivity to odors and  noises.  Depression or mood changes.  Painful menstrual periods (dysmenorrhea).  Dry skin or eyes.  These symptoms can vary over time.    Myofascial pain syndrome  Symptoms of myofascial pain syndrome include:  Tight, ropy bands of muscle.  Uncomfortable sensations in muscle areas. These may include aching, cramping, burning, numbness, tingling, and weakness.  Difficulty moving certain parts of the body freely (poor range of motion).    How is this diagnosed?  This condition may be diagnosed by your symptoms and medical history. You will also have a physical exam. In general:  Fibromyalgia is diagnosed if you have pain, fatigue, and other symptoms for more than 3 months, and symptoms cannot be explained by another condition.  Myofascial pain syndrome is diagnosed if you have trigger points in your muscles, and those trigger points are tender and cause pain elsewhere in your body (referred pain).    How is this treated?    Treatment for these conditions depends on the type that you have.  For fibromyalgia, a healthy lifestyle is the most important treatment including aerobic and strength exercises. Different types of medicines are used to help treat pain and include:  NSAIDs.  Medicines for treating depression.  Medicines that help control seizures.  Medicines that relax the muscles.  Treatment for myofascial pain syndrome includes:  Pain medicines, such as NSAIDs.  Cooling and stretching of muscles.  Massage therapy with myofascial release technique.  Trigger point injections.    Treating these conditions often requires a team of health care providers. These may include:  Your primary care provider.  A physical therapist.  Complementary health care providers, such as massage therapists or acupuncturists.  A psychiatrist for cognitive behavioral therapy.    Follow these instructions at home:  Medicines  Take over-the-counter and prescription medicines only as told by your health care provider.  Ask your health care  provider if the medicine prescribed to you:  Requires you to avoid driving or using machinery.  Can cause constipation. You may need to take these actions to prevent or treat constipation:  Drink enough fluid to keep your urine pale yellow.  Take over-the-counter or prescription medicines.  Eat foods that are high in fiber, such as beans, whole grains, and fresh fruits and vegetables.  Limit foods that are high in fat and processed sugars, such as fried or sweet foods.    Lifestyle    Do exercises as told by your health care provider or physical therapist.  Practice relaxation techniques to control your stress. You may want to try:  Biofeedback.  Visual imagery.  Hypnosis.  Muscle relaxation.  Yoga.  Meditation.  Maintain a healthy lifestyle. This includes eating a healthy diet and getting enough sleep.  Do not use any products that contain nicotine or tobacco. These products include cigarettes, chewing tobacco, and vaping devices, such as e-cigarettes. If you need help quitting, ask your health care provider.    General instructions  Talk to your health care provider about complementary treatments, such as acupuncture or massage.  Do not do activities that stress or strain your muscles. This includes repetitive motions and heavy lifting.  Keep all follow-up visits. This is important.    Where to find support  Consider joining a support group with others who are diagnosed with this condition.  National Fibromyalgia Association: fmaware.org    Where to find more information  U.S. Pain Foundation: uspainfoundation.org    Contact a health care provider if:  You have new symptoms.  Your symptoms get worse or your pain is severe.  You have side effects from your medicines.  You have trouble sleeping.  Your condition is causing depression or anxiety.    Get help right away if:  You have thoughts of hurting yourself or others.    Get help right away if you feel like you may hurt yourself or others, or have thoughts about  taking your own life. Go to your nearest emergency room or:  Call 911.  Call the National Suicide Prevention Lifeline at 1-365.967.8900 or 694. This is open 24 hours a day.  Text the Crisis Text Line at 896880.  This information is not intended to replace advice given to you by your health care provider. Make sure you discuss any questions you have with your health care provider.  Document Revised: 09/25/2023 Document Reviewed: 11/18/2022  [employer] Patient Education © 2024 [employer]    Prophylactic measure Atrial fibrillation

## 2024-11-27 LAB
CHROMATIN AB SERPL-ACNC: 17.4 IU/ML (ref 0–14)
HAV IGM SERPL QL IA: NORMAL
HBV CORE IGM SERPL QL IA: NORMAL
HBV SURFACE AG SERPL QL IA: NORMAL
HCV AB SER QL: NORMAL

## 2024-11-29 LAB — CCP IGA+IGG SERPL IA-ACNC: 17 UNITS (ref 0–19)

## 2024-12-06 LAB — HLA-B27 QL NAA+PROBE: NEGATIVE

## 2025-01-14 PROCEDURE — 87086 URINE CULTURE/COLONY COUNT: CPT

## 2025-01-27 ENCOUNTER — OFFICE VISIT (OUTPATIENT)
Age: 50
End: 2025-01-27
Payer: MEDICARE

## 2025-01-27 VITALS
WEIGHT: 205 LBS | SYSTOLIC BLOOD PRESSURE: 118 MMHG | BODY MASS INDEX: 35 KG/M2 | HEIGHT: 64 IN | DIASTOLIC BLOOD PRESSURE: 82 MMHG

## 2025-01-27 DIAGNOSIS — M25.422 ELBOW SWELLING, LEFT: ICD-10-CM

## 2025-01-27 DIAGNOSIS — Z79.4 TYPE 2 DIABETES MELLITUS WITH HYPERGLYCEMIA, WITH LONG-TERM CURRENT USE OF INSULIN: ICD-10-CM

## 2025-01-27 DIAGNOSIS — E11.65 TYPE 2 DIABETES MELLITUS WITH HYPERGLYCEMIA, WITH LONG-TERM CURRENT USE OF INSULIN: ICD-10-CM

## 2025-01-27 DIAGNOSIS — M77.12 LATERAL EPICONDYLITIS OF LEFT ELBOW: Primary | ICD-10-CM

## 2025-01-27 PROCEDURE — 1159F MED LIST DOCD IN RCRD: CPT | Performed by: STUDENT IN AN ORGANIZED HEALTH CARE EDUCATION/TRAINING PROGRAM

## 2025-01-27 PROCEDURE — 1160F RVW MEDS BY RX/DR IN RCRD: CPT | Performed by: STUDENT IN AN ORGANIZED HEALTH CARE EDUCATION/TRAINING PROGRAM

## 2025-01-27 PROCEDURE — 3079F DIAST BP 80-89 MM HG: CPT | Performed by: STUDENT IN AN ORGANIZED HEALTH CARE EDUCATION/TRAINING PROGRAM

## 2025-01-27 PROCEDURE — 3074F SYST BP LT 130 MM HG: CPT | Performed by: STUDENT IN AN ORGANIZED HEALTH CARE EDUCATION/TRAINING PROGRAM

## 2025-01-27 PROCEDURE — 99204 OFFICE O/P NEW MOD 45 MIN: CPT | Performed by: STUDENT IN AN ORGANIZED HEALTH CARE EDUCATION/TRAINING PROGRAM

## 2025-01-27 NOTE — PROGRESS NOTES
[facility] [place]     Office Visit       Date: 01/27/2025   Patient Name: Bettye Diaz  MRN: [id number]  YOB: 1975    Referring Physician: Nafisa Young MD     Chief Complaint:   Chief Complaint   Patient presents with    Left Elbow - Pain     History of Present Illness:   Bettye Diaz is a 49 y.o. female who presents with new problem of: left elbow pain.  Onset: atraumatic and gradual in nature. The issue has been ongoing for 7 week(s). Pain is a 7/10 on the pain scale. Pain is described as aching and shooting. Associated symptoms include pain, swelling, and stiffness. The pain is worse with sleeping and working; forearm band and elevating improve the pain. Previous treatments have included: bracing and NSAIDS.    Subjective   Review of Systems: Review of Systems   Constitutional:  Negative for chills, fever, unexpected weight gain and unexpected weight loss.   HENT:  Negative for congestion, postnasal drip and rhinorrhea.    Eyes:  Negative for blurred vision.   Respiratory:  Negative for shortness of breath.    Cardiovascular:  Negative for leg swelling.   Gastrointestinal:  Negative for abdominal pain, nausea and vomiting.   Genitourinary:  Negative for difficulty urinating.   Musculoskeletal:  Positive for arthralgias. Negative for gait problem, joint swelling and myalgias.   Skin:  Negative for skin lesions and wound.   Neurological:  Negative for dizziness, weakness, light-headedness and numbness.   Hematological:  Does not bruise/bleed easily.   Psychiatric/Behavioral:  Negative for depressed mood.    All other systems reviewed and are negative.       I have reviewed the following portions of the patient's history:History of Present Illness and review of systems.    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 Luna, repeat in 10 years    HYSTERECTOMY  2016    LUMBAR DISCECTOMY      NECK SURGERY  2015    OOPHORECTOMY Right     OVARY SURGERY Bilateral     ovarian drilling    TONSILLECTOMY         Family History:   Family History   Problem Relation Age of Onset    Hypertension Mother     Diabetes Mother     Kidney cancer Mother     Cancer Father         Prostate    Pancreatitis Father     Prostate cancer Father     Heart disease Father     Rheum arthritis Sister     Breast cancer Paternal Aunt         Late 40's    Breast cancer Paternal Aunt         Early 50's    Breast cancer Paternal Aunt         Late 40's    Ovarian cancer Maternal Grandmother 50    Osteoporosis Maternal Grandmother     Diabetes Maternal Grandmother     Heart disease Maternal Grandmother     Ovarian cancer Maternal Grandfather 53    Diabetes Maternal Grandfather     Ovarian cancer Other 45        cousins    Endometrial cancer Neg Hx        Social History:   Social History     Socioeconomic History    Marital status:    Tobacco Use    Smoking status: Never    Smokeless tobacco: Never   Vaping Use    Vaping status: Never Used   Substance and Sexual Activity    Alcohol use: No    Drug use: No    Sexual activity: Yes     Partners: Male     Birth control/protection: Surgical, Hysterectomy       Medications:   Current Outpatient Medications:     BD  "Insulin Syringe U/F 31G X 5/16\" 1 ML misc, USE 1 SYRINGE 4 TIMES DAILY AS DIRECTED, Disp: , Rfl:     Blood Glucose Monitoring Suppl (Accu-Chek Guide Me) w/Device kit, USE TO CHECK GLUCOSE AS DIRECTED THREE TIMES DAILY, Disp: , Rfl:     cetirizine (ZyrTEC) 10 MG chewable tablet, Chew 1 tablet Daily As Needed for Allergies., Disp: , Rfl:     DULoxetine (CYMBALTA) 60 MG capsule, TAKE 1 CAPSULE BY MOUTH ONCE DAILY FOR ANXIETY AND FIBROMYALGIA, Disp: , Rfl:     fluticasone (FLONASE) 50 MCG/ACT nasal spray, Spray 2 sprays every day by intranasal route for 30 days., Disp: , Rfl:     Glucagon 1 MG/0.2ML solution auto-injector, Inject 1 mg under the skin into the appropriate area as directed Every 15 (Fifteen) Minutes As Needed (Hypoglycemia)., Disp: 0.4 mL, Rfl: 3    glucose blood test strip, Use 3/day  DX CODE E 11.9, Disp: , Rfl:     Insulin Aspart (novoLOG) 100 UNIT/ML injection, Inject 20 Units under the skin into the appropriate area as directed 3 (Three) Times a Day Before Meals., Disp: 100 mL, Rfl: 3    insulin glargine (LANTUS, SEMGLEE) 100 UNIT/ML injection, Inject 90 Units under the skin into the appropriate area as directed Every Night., Disp: , Rfl:     Lancets 28G misc, lancets  Two times a day, Disp: , Rfl:     metFORMIN ER (GLUCOPHAGE-XR) 500 MG 24 hr tablet, Take 2 tablets by mouth Daily., Disp: , Rfl:     nabumetone (RELAFEN) 750 MG tablet, Take 1 tablet by mouth 2 (Two) Times a Day As Needed for Mild Pain., Disp: 60 tablet, Rfl: 5    omeprazole (priLOSEC) 40 MG capsule, Take 1 capsule by mouth Daily., Disp: , Rfl:     telmisartan (MICARDIS) 80 MG tablet, Take 1 tablet by mouth Daily., Disp: 90 tablet, Rfl: 3    ipratropium (ATROVENT) 0.06 % nasal spray, Administer 2 sprays into the nostril(s) as directed by provider 4 (Four) Times a Day for 7 days., Disp: 15 mL, Rfl: 0    Allergies:   Allergies   Allergen Reactions    Contrast Dye (Echo Or Unknown Ct/Mr) Anaphylaxis    Iodinated Contrast Media Anaphylaxis " "   Iodine Swelling     Ct dye/DROPS BLOOD PRESSURE    Shellfish-Derived Products Swelling     SWELLING/NAUSEA & VOMITING    Meloxicam Rash     Other reaction(s): My doctor told me to stop taking aspirin because of GI upset    Metronidazole GI Intolerance, Diarrhea, Nausea And Vomiting, Other (See Comments) and Rash     2when taken orally and topically localized rash and blisters  BURNING  BURNING  BURNING      Miconazole Nitrate Rash    Oxycodone Rash    Sulfa Antibiotics Rash    Sulfamethoxazole-Trimethoprim Rash       I reviewed the patient's chief complaint, history of present illness, review of systems, past medical history, surgical history, family history, social history, medications and allergy list.     Objective    Vital Signs:   Vitals:    01/27/25 1501   BP: 118/82   Weight: 93 kg (205 lb)   Height: 163.2 cm (64.25\")     Body mass index is 34.91 kg/m².   BMI is >= 30 and <35. (Class 1 Obesity). The following options were offered after discussion;: exercise counseling/recommendations and nutrition counseling/recommendations      Patient reports that she is a non-smoker and has not ever been a smoker.  This behavior was applauded and she was encouraged to continue in smoking cessation.  We will continue to monitor at subsequent visits.    Ortho Exam:  Skin: Right Upper Extremity: normal. Left Upper Extremity: normal.   Left Elbow Exam:   Normal contour of the elbow forearm and wrist.   Negative ecchymosis and negative swelling.   Full range of motion of the elbow without pain. Full supination and pronation.   no laxity with varus or valgus stress   Positive tenderness to palpation over the lateral epicondyle and along the extensor digitorum tendon origin.   Pain with resisted wrist extension.   Pain with resisted middle finger extension.   Negative pain with index finger extension.   Sensation grossly intact to all digits.   Radial pulse intact.    Results Review:   Imaging Results (Last 24 Hours)       ** " No results found for the last 24 hours. **        I personally reviewed and interpreted radiographs of the left elbow from 1/20/2025.  No acute fracture or dislocation.  Small enthesophyte noted at the lateral epicondyle.  No significant degenerative changes identified.    Procedures    Assessment / Plan    Assessment/Plan:   Diagnoses and all orders for this visit:    1. Lateral epicondylitis of left elbow (Primary)    2. Elbow swelling, left    3. Type 2 diabetes mellitus with hyperglycemia, with long-term current use of insulin    Patient presents for evaluation of left elbow pain and swelling.  She localizes pain to the lateral epicondyle of the elbow.  Radiographs show small enthesophyte here.  Pain worsened by wrist and middle finger extension.  We will treat her for lateral epicondylitis.  She is already using a strap brace.  Recommend that she use ibuprofen daily for pain control.  I will give her some home exercises to work through.  I will see her back in 5 weeks to monitor response and decide on additional treatment.  Consider injection if not improved at next visit.    Previous imaging studies reviewed: 1/20/2025-radiographs of the left elbow.    Previous laboratory results reviewed: 11/26/2024-eGFR 107.6.  10/30/2024-hemoglobin A1c 8.2%.      Follow Up:   Return in about 5 weeks (around 3/3/2025) for Recheck.      Ricardo Sandhu MD  [facility] [place] [facility]

## 2025-03-03 ENCOUNTER — OFFICE VISIT (OUTPATIENT)
Age: 50
End: 2025-03-03
Payer: MEDICARE

## 2025-03-03 VITALS
SYSTOLIC BLOOD PRESSURE: 138 MMHG | DIASTOLIC BLOOD PRESSURE: 74 MMHG | BODY MASS INDEX: 35.58 KG/M2 | HEIGHT: 64 IN | WEIGHT: 208.4 LBS

## 2025-03-03 DIAGNOSIS — M77.12 LATERAL EPICONDYLITIS OF LEFT ELBOW: Primary | ICD-10-CM

## 2025-03-03 RX ORDER — LIDOCAINE HYDROCHLORIDE 10 MG/ML
2 INJECTION, SOLUTION EPIDURAL; INFILTRATION; INTRACAUDAL; PERINEURAL
Status: COMPLETED | OUTPATIENT
Start: 2025-03-03 | End: 2025-03-03

## 2025-03-03 RX ORDER — TRIAMCINOLONE ACETONIDE 40 MG/ML
80 INJECTION, SUSPENSION INTRA-ARTICULAR; INTRAMUSCULAR
Status: COMPLETED | OUTPATIENT
Start: 2025-03-03 | End: 2025-03-03

## 2025-03-03 RX ADMIN — LIDOCAINE HYDROCHLORIDE 2 ML: 10 INJECTION, SOLUTION EPIDURAL; INFILTRATION; INTRACAUDAL; PERINEURAL at 14:48

## 2025-03-03 RX ADMIN — TRIAMCINOLONE ACETONIDE 80 MG: 40 INJECTION, SUSPENSION INTRA-ARTICULAR; INTRAMUSCULAR at 14:48

## 2025-03-03 NOTE — PROGRESS NOTES
Procedure   - Hand/Upper Extremity Injection: L elbow for lateral epicondylitis on 3/3/2025 2:48 PM  Indications: pain  Details: 25 G (long) needle, lateral approach  Medications: 2 mL lidocaine PF 1% 1 %; 80 mg triamcinolone acetonide 40 MG/ML  Outcome: tolerated well, no immediate complications  Procedure, treatment alternatives, risks and benefits explained, specific risks discussed. Consent was given by the patient. Immediately prior to procedure a time out was called to verify the correct patient, procedure, equipment, support staff and site/side marked as required. Patient was prepped and draped in the usual sterile fashion.

## 2025-03-03 NOTE — PROGRESS NOTES
"                                      [facility] Follow Up Visit     Office Follow Up      Date: 03/03/2025   Patient Name: Bettye Diaz  MRN: [id number]  YOB: 1975    Referring Physician: No ref. provider found     Chief Complaint:   Chief Complaint   Patient presents with    Follow-up     5 week follow up- Lateral epicondylitis of left elbow     History of Present Illness: Bettye Diaz is a 49 y.o. female who returns to clinic today for follow up on left elbow pain. Her pain is a 8 /10 on the pain scale. Patient has tried the following previous treatments bracing , anti-inflammatories, and home exercises. She mentions current symptoms of pain , swelling, and stiffness. She states that these treatments have worsened.    Subjective     Review of Systems: Review of Systems   Constitutional:  Negative for chills, fever, unexpected weight gain and unexpected weight loss.   HENT:  Negative for congestion, postnasal drip and rhinorrhea.    Eyes:  Negative for blurred vision.   Respiratory:  Negative for shortness of breath.    Cardiovascular:  Negative for leg swelling.   Gastrointestinal:  Negative for abdominal pain, nausea and vomiting.   Genitourinary:  Negative for difficulty urinating.   Musculoskeletal:  Positive for arthralgias. Negative for gait problem, joint swelling and myalgias.   Skin:  Negative for skin lesions and wound.   Neurological:  Negative for dizziness, weakness, light-headedness and numbness.   Hematological:  Does not bruise/bleed easily.   Psychiatric/Behavioral:  Negative for depressed mood.         Medications:   Current Outpatient Medications:     BD Insulin Syringe U/F 31G X 5/16\" 1 ML misc, USE 1 SYRINGE 4 TIMES DAILY AS DIRECTED, Disp: , Rfl:     Blood Glucose Monitoring Suppl (Accu-Chek Guide Me) w/Device kit, USE TO CHECK GLUCOSE AS DIRECTED THREE TIMES DAILY, Disp: , Rfl:     cetirizine (ZyrTEC) 10 MG chewable tablet, Chew 1 tablet Daily " As Needed for Allergies., Disp: , Rfl:     DULoxetine (CYMBALTA) 60 MG capsule, TAKE 1 CAPSULE BY MOUTH ONCE DAILY FOR ANXIETY AND FIBROMYALGIA, Disp: , Rfl:     fluticasone (FLONASE) 50 MCG/ACT nasal spray, Spray 2 sprays every day by intranasal route for 30 days., Disp: , Rfl:     Glucagon 1 MG/0.2ML solution auto-injector, Inject 1 mg under the skin into the appropriate area as directed Every 15 (Fifteen) Minutes As Needed (Hypoglycemia)., Disp: 0.4 mL, Rfl: 3    glucose blood test strip, Use 3/day  DX CODE E 11.9, Disp: , Rfl:     Insulin Aspart (novoLOG) 100 UNIT/ML injection, Inject 20 Units under the skin into the appropriate area as directed 3 (Three) Times a Day Before Meals., Disp: 100 mL, Rfl: 3    insulin glargine (LANTUS, SEMGLEE) 100 UNIT/ML injection, Inject 90 Units under the skin into the appropriate area as directed Every Night., Disp: , Rfl:     Lancets 28G misc, lancets  Two times a day, Disp: , Rfl:     metFORMIN ER (GLUCOPHAGE-XR) 500 MG 24 hr tablet, Take 2 tablets by mouth Daily., Disp: , Rfl:     nabumetone (RELAFEN) 750 MG tablet, Take 1 tablet by mouth 2 (Two) Times a Day As Needed for Mild Pain., Disp: 60 tablet, Rfl: 5    omeprazole (priLOSEC) 40 MG capsule, Take 1 capsule by mouth Daily., Disp: , Rfl:     telmisartan (MICARDIS) 80 MG tablet, Take 1 tablet by mouth Daily., Disp: 90 tablet, Rfl: 3    ipratropium (ATROVENT) 0.06 % nasal spray, Administer 2 sprays into the nostril(s) as directed by provider 4 (Four) Times a Day for 7 days., Disp: 15 mL, Rfl: 0    Allergies:   Allergies   Allergen Reactions    Contrast Dye (Echo Or Unknown Ct/Mr) Anaphylaxis    Iodinated Contrast Media Anaphylaxis    Iodine Swelling     Ct dye/DROPS BLOOD PRESSURE    Shellfish-Derived Products Swelling     SWELLING/NAUSEA & VOMITING    Meloxicam Rash     Other reaction(s): My doctor told me to stop taking aspirin because of GI upset    Metronidazole GI Intolerance, Diarrhea, Nausea And Vomiting, Other (See  "Comments) and Rash     2when taken orally and topically localized rash and blisters  BURNING  BURNING  BURNING      Miconazole Nitrate Rash    Oxycodone Rash    Sulfa Antibiotics Rash    Sulfamethoxazole-Trimethoprim Rash       I have reviewed and updated the patient's chief complaint, history of present illness, review of systems, past medical history, surgical history, family history, social history, medications and allergy list as appropriate.     Objective      Vital Signs:   Vitals:    03/03/25 1430   BP: 138/74   Weight: 94.5 kg (208 lb 6.4 oz)   Height: 163.2 cm (64.25\")     Body mass index is 35.49 kg/m².  BMI is >= 30 and <35. (Class 1 Obesity). The following options were offered after discussion;: exercise counseling/recommendations and nutrition counseling/recommendations      Patient reports that she is a non-smoker and has not ever been a smoker.  This behavior was applauded and she was encouraged to continue in smoking cessation.  We will continue to monitor at subsequent visits.     Ortho Exam:  Skin: Right Upper Extremity: normal. Left Upper Extremity: normal.   Left Elbow Exam:   Normal contour of the elbow forearm and wrist.   Negative ecchymosis and negative swelling.   Full range of motion of the elbow without pain. Full supination and pronation.   no laxity with varus or valgus stress   Positive tenderness to palpation over the lateral epicondyle and along the extensor digitorum tendon origin.   Pain with resisted wrist extension.   Pain with resisted middle finger extension.   Negative pain with index finger extension.   Sensation grossly intact to all digits.   Radial pulse intact.    Results Review:   Imaging Results (Last 24 Hours)       ** No results found for the last 24 hours. **            Procedures    Assessment / Plan      Assessment/Plan:   Diagnoses and all orders for this visit:    1. Lateral epicondylitis of left elbow (Primary)    Other orders  -     - Hand/Upper Extremity " Injection    Plan:  Symptoms persist at the lateral elbow for 6 months now despite heat ice anti-inflammatories home exercise program and strap brace.  Recommend continuing with above modalities.  Will inject corticosteroid at lateral epicondyle today in the office.  Follow up as symptoms dictate.    Follow Up:   Return if symptoms worsen or fail to improve.      Ricardo Sandhu MD  [facility]

## 2025-03-10 PROCEDURE — 87798 DETECT AGENT NOS DNA AMP: CPT | Performed by: NURSE PRACTITIONER

## 2025-03-10 PROCEDURE — 87661 TRICHOMONAS VAGINALIS AMPLIF: CPT | Performed by: NURSE PRACTITIONER

## 2025-03-10 PROCEDURE — 87591 N.GONORRHOEAE DNA AMP PROB: CPT | Performed by: NURSE PRACTITIONER

## 2025-03-10 PROCEDURE — 87491 CHLMYD TRACH DNA AMP PROBE: CPT | Performed by: NURSE PRACTITIONER

## 2025-03-10 PROCEDURE — 87801 DETECT AGNT MULT DNA AMPLI: CPT | Performed by: NURSE PRACTITIONER

## 2025-03-10 PROCEDURE — 87529 HSV DNA AMP PROBE: CPT | Performed by: NURSE PRACTITIONER

## 2025-03-26 ENCOUNTER — TRANSCRIBE ORDERS (OUTPATIENT)
Dept: DIABETES SERVICES | Facility: HOSPITAL | Age: 50
End: 2025-03-26
Payer: MEDICARE

## 2025-03-26 DIAGNOSIS — Z79.4 DIABETES MELLITUS TYPE 2, INSULIN DEPENDENT: ICD-10-CM

## 2025-03-26 DIAGNOSIS — K31.84 GASTROPARESIS: Primary | ICD-10-CM

## 2025-03-26 DIAGNOSIS — E11.9 DIABETES MELLITUS TYPE 2, INSULIN DEPENDENT: ICD-10-CM

## 2025-03-26 DIAGNOSIS — Z79.4 INSULIN LONG-TERM USE: ICD-10-CM

## 2025-06-18 ENCOUNTER — TRANSCRIBE ORDERS (OUTPATIENT)
Dept: GENERAL RADIOLOGY | Facility: HOSPITAL | Age: 50
End: 2025-06-18
Payer: MEDICARE

## 2025-06-18 ENCOUNTER — HOSPITAL ENCOUNTER (OUTPATIENT)
Dept: GENERAL RADIOLOGY | Facility: HOSPITAL | Age: 50
Discharge: HOME | End: 2025-06-18
Admitting: NURSE PRACTITIONER
Payer: MEDICARE

## 2025-06-18 DIAGNOSIS — M25.551 PELVIC JOINT PAIN, RIGHT: ICD-10-CM

## 2025-06-18 DIAGNOSIS — M25.551 PELVIC JOINT PAIN, RIGHT: Primary | ICD-10-CM

## 2025-06-18 PROCEDURE — 73502 X-RAY EXAM HIP UNI 2-3 VIEWS: CPT
